# Patient Record
Sex: MALE | Race: WHITE | NOT HISPANIC OR LATINO | Employment: UNEMPLOYED | URBAN - METROPOLITAN AREA
[De-identification: names, ages, dates, MRNs, and addresses within clinical notes are randomized per-mention and may not be internally consistent; named-entity substitution may affect disease eponyms.]

---

## 2023-06-27 ENCOUNTER — HOSPITAL ENCOUNTER (OUTPATIENT)
Dept: DATA CONVERSION | Facility: HOSPITAL | Age: 43
End: 2023-06-27

## 2023-10-23 DIAGNOSIS — F11.90 OPIOID USE DISORDER: Primary | ICD-10-CM

## 2023-10-23 RX ORDER — BUPRENORPHINE AND NALOXONE 8; 2 MG/1; MG/1
1 FILM, SOLUBLE BUCCAL; SUBLINGUAL 3 TIMES DAILY
Qty: 45 FILM | Refills: 0 | Status: SHIPPED | OUTPATIENT
Start: 2023-10-23 | End: 2023-11-07 | Stop reason: SDUPTHER

## 2023-10-23 NOTE — PROGRESS NOTES
Received communication from patient, due to scheduling error secondary to EMR transition patient was not scheduled for his follow up clinic appointment 10/17, patients prior prescription for buprenorphine running out. Given concern for withdrawal/ patient at risk for recurrent opioid use with abrupt cessation of buprenorphine therapy I did send a bridge prescription to the patient's pharmacy, follow up appointment scheduled 11/7/2023 for reassessment.

## 2023-10-28 ENCOUNTER — PHARMACY VISIT (OUTPATIENT)
Dept: PHARMACY | Facility: CLINIC | Age: 43
End: 2023-10-28
Payer: MEDICAID

## 2023-10-30 ENCOUNTER — PHARMACY VISIT (OUTPATIENT)
Dept: PHARMACY | Facility: CLINIC | Age: 43
End: 2023-10-30
Payer: MEDICAID

## 2023-10-30 PROCEDURE — RXMED WILLOW AMBULATORY MEDICATION CHARGE

## 2023-11-07 ENCOUNTER — HOSPITAL ENCOUNTER (OUTPATIENT)
Dept: EMERGENCY MEDICINE | Facility: HOSPITAL | Age: 43
Discharge: HOME | End: 2023-11-07
Payer: MEDICARE

## 2023-11-07 DIAGNOSIS — F11.90 OPIOID USE DISORDER: Primary | ICD-10-CM

## 2023-11-07 PROCEDURE — G2010 REMOT IMAGE SUBMIT BY PT: HCPCS | Performed by: EMERGENCY MEDICINE

## 2023-11-07 RX ORDER — BUPRENORPHINE AND NALOXONE 8; 2 MG/1; MG/1
1 FILM, SOLUBLE BUCCAL; SUBLINGUAL 3 TIMES DAILY
Qty: 30 FILM | Refills: 1 | Status: SHIPPED | OUTPATIENT
Start: 2023-11-07 | End: 2024-03-12 | Stop reason: WASHOUT

## 2023-11-07 ASSESSMENT — ENCOUNTER SYMPTOMS
TREMORS: 0
CHOKING: 0
ARTHRALGIAS: 1
HEADACHES: 0
CHILLS: 0
FEVER: 0
VOMITING: 0
NAUSEA: 0
DYSPHORIC MOOD: 0
JOINT SWELLING: 1

## 2023-11-07 NOTE — PROGRESS NOTES
Subjective   Patient ID: Alden Rothman is a 43 y.o. male who presents for follow up of opioid use disorder    Patient presenting to follow-up clinic for continued management of opioid use disorder, currently in remission on buprenorphine therapy.  Since our last visit, patient feels he has been doing well, moved in with his new girlfriend and is happy with his current housing situation.  Also gained employment at a [employer] in [place], appears happy with his new employment status.  Did recently [place] the [facility] and is requesting a change in pharmacy location due to his new geographical location.  Does note that he reinjured his knee recently, has been treating with a compression brace as well as over-the-counter acetaminophen/ibuprofen.  States that he plans on seeking further care for evaluation of the knee as it has not improved over the last several days.  Otherwise denies any breakthrough cravings/illicit substance use.         Review of Systems   Constitutional:  Negative for chills and fever.   Respiratory:  Negative for choking.    Cardiovascular:  Negative for chest pain.   Gastrointestinal:  Negative for nausea and vomiting.   Musculoskeletal:  Positive for arthralgias and joint swelling.   Neurological:  Negative for tremors and headaches.   Psychiatric/Behavioral:  Negative for dysphoric mood.        Objective   There were no vitals taken for this visit.    Physical Exam  Vitals reviewed: Virtual visit.   Constitutional:       General: He is awake.   Pulmonary:      Comments: Speaking in clear full sentences, no stridor  Neurological:      Mental Status: He is alert.      GCS: GCS eye subscore is 4. GCS verbal subscore is 5. GCS motor subscore is 6.   Psychiatric:         Attention and Perception: Attention and perception normal. He is attentive.         Behavior: Behavior is cooperative.         Assessment/Plan   Diagnoses and all orders for this visit:  Opioid use disorder  -      buprenorphine-naloxone (Suboxone) 8-2 mg SL film; Place 1 Film under the tongue 3 times a day.  -     Drug Screen, Urine With Reflex to Confirmation; Future  -     Buprenorphine Screen To Confirm,Urine; Future    Patient presenting for ongoing evaluation/management of opioid use disorder, currently in remission, stable on buprenorphine therapy for some time.  Overall patient continues to deny any breakthrough substance use, denies any oversedation with use of buprenorphine and denies any withdrawal symptoms/cravings.  No red flags identified today for breakthrough use.  Patient recently starting a new job and obtaining more stable housing which he seems pleased with.  On OARRS review patient did obtain recent bridge prescription, plan to refill the patient's buprenorphine prescription until our next visit.  I did order urine drug screen testing, patient will obtain this at his earliest convenience.  Otherwise plan to follow-up in 6 weeks in clinic preferably in person.

## 2023-11-07 NOTE — PROGRESS NOTES
Alden Rothman is a 43 y.o. male  presenting to clinic for continued evaluation and management of opioid use disorder  Subjective   Patient conveys he is doing much better since our last appointment, moved in with his significant other. Conveys compliance with his buprenorphine regimen. Has garnered gainful employment recently that he feels has been supportive of his goal of continued sobriety. Denies any breakthrough use of opioids or other substances. Denies any cravings.        Objective     Last Recorded Vitals  There were no vitals taken for this visit.  Intake/Output last 3 Shifts:  No intake or output data in the 24 hours ending 11/28/23 1058  Daily Weight  No data found for Wt    Physical Exam  Constitutional:       General: He is not in acute distress.     Appearance: Normal appearance.   HENT:      Head: Normocephalic and atraumatic.   Pulmonary:      Effort: Pulmonary effort is normal. No tachypnea, bradypnea or respiratory distress.   Musculoskeletal:      Cervical back: Full passive range of motion without pain.   Neurological:      General: No focal deficit present.      Mental Status: He is alert and oriented to person, place, and time.      Cranial Nerves: No dysarthria or facial asymmetry.      Motor: No tremor.   Psychiatric:         Attention and Perception: Attention and perception normal. He does not perceive auditory or visual hallucinations.         Mood and Affect: Mood and affect normal.         Speech: Speech normal.         Behavior: Behavior normal. Behavior is cooperative.         Thought Content: Thought content normal.         Cognition and Memory: Cognition normal.         Judgment: Judgment normal.         Relevant Results  Electrocardiogram 12 Lead  Please see ED Provider Note for formal interpretation  Confirmed by Yvonne Cotto ([id number]) on 11/5/2022 9:39:34 PM                 Assessment/Plan      Patient stable on buprenorphine regimen, will continue at 8-2mg suboxone TID. Patient  did request gabapentin for acute on chronic knee pain however I will provide referral to orthopedic surgery/ primary care for this as it is outside the scope of this clinic to manage pain. Did order UDS testing for patient to complete as an outpatient, if he is unable to complete this by his next appointment in 6 weeks will obtain during next in-person visit. Prescription sent to patient's pharmacy.            Ervin Rojas MD    Procedures

## 2023-11-13 DIAGNOSIS — F11.90 OPIOID USE DISORDER: Primary | ICD-10-CM

## 2023-11-13 RX ORDER — BUPRENORPHINE AND NALOXONE 8; 2 MG/1; MG/1
1 FILM, SOLUBLE BUCCAL; SUBLINGUAL 3 TIMES DAILY
Qty: 90 FILM | Refills: 1 | Status: SHIPPED | OUTPATIENT
Start: 2023-11-19 | End: 2024-01-09 | Stop reason: SDUPTHER

## 2023-11-13 NOTE — PROGRESS NOTES
Issue with patient's most recent Buprenorphine prescription, per discussion with pharmacist script sent for 30 strips (10 day supply). Per discussion with patient's pharmacy, refill was cancelled and I resent prescription for 1 month with a refill to bridge patient to next appointment.

## 2023-11-28 NOTE — ADDENDUM NOTE
Encounter addended by: Ervin Rojas MD on: 11/28/2023 11:04 AM   Actions taken: Clinical Note Signed, SmartForm saved, Charge Capture section accepted

## 2024-01-08 PROBLEM — M25.539 PAIN, WRIST JOINT: Status: ACTIVE | Noted: 2024-01-08

## 2024-01-08 RX ORDER — FLUTICASONE PROPIONATE 50 MCG
SPRAY, SUSPENSION (ML) NASAL
COMMUNITY
Start: 2023-02-22

## 2024-01-08 RX ORDER — LORATADINE 10 MG/1
TABLET ORAL
COMMUNITY
Start: 2023-02-22

## 2024-01-08 RX ORDER — NALOXONE HYDROCHLORIDE 4 MG/.1ML
SPRAY NASAL
COMMUNITY
Start: 2022-11-15

## 2024-01-08 RX ORDER — NAPROXEN 500 MG/1
500 TABLET ORAL
COMMUNITY
Start: 2023-09-18

## 2024-01-08 RX ORDER — AZITHROMYCIN 500 MG/1
TABLET, FILM COATED ORAL
COMMUNITY
Start: 2023-02-22

## 2024-01-08 RX ORDER — IBUPROFEN 800 MG/1
1 TABLET ORAL 3 TIMES DAILY PRN
COMMUNITY
Start: 2022-12-09

## 2024-01-08 RX ORDER — DOXYCYCLINE HYCLATE 100 MG
TABLET ORAL
COMMUNITY
Start: 2023-07-01

## 2024-01-09 ENCOUNTER — HOSPITAL ENCOUNTER (OUTPATIENT)
Dept: EMERGENCY MEDICINE | Facility: HOSPITAL | Age: 44
Discharge: HOME | End: 2024-01-09
Payer: MEDICARE

## 2024-01-09 DIAGNOSIS — M25.561 ACUTE PAIN OF RIGHT KNEE: Primary | ICD-10-CM

## 2024-01-09 DIAGNOSIS — F11.90 OPIOID USE DISORDER: Primary | ICD-10-CM

## 2024-01-09 DIAGNOSIS — F11.90 OPIOID USE DISORDER: ICD-10-CM

## 2024-01-09 PROCEDURE — 99213 OFFICE O/P EST LOW 20 MIN: CPT | Performed by: EMERGENCY MEDICINE

## 2024-01-09 RX ORDER — IBUPROFEN 600 MG/1
600 TABLET ORAL EVERY 6 HOURS PRN
Qty: 56 TABLET | Refills: 0 | Status: SHIPPED | OUTPATIENT
Start: 2024-01-09 | End: 2024-01-23

## 2024-01-09 RX ORDER — BUPRENORPHINE AND NALOXONE 8; 2 MG/1; MG/1
1 FILM, SOLUBLE BUCCAL; SUBLINGUAL 3 TIMES DAILY
Qty: 90 FILM | Refills: 1 | Status: SHIPPED | OUTPATIENT
Start: 2024-01-09 | End: 2024-03-12 | Stop reason: WASHOUT

## 2024-01-09 NOTE — PROGRESS NOTES
Alden Rothman is a 43 y.o. male on day 0 of admission presenting with No Principal Problem: There is no principal problem currently on the Problem List. Please update the Problem List and refresh..    Subjective   Patient presenting for ongoing evaluation and management of opioid use disorder, stable on suboxone for several years now. Doing well since last visit, is living with his girlfriend whom is supporting him in his sobriety. Notes no breakthrough use or cravings on current regimen. Does note that he has been having right knee pain over the last 5-6 months, notes his mother had a history of osteoarthritis, he denies any trauma to the area or other clear precipitant for pain, has been using APAP/ ibuprofen for pain control with positive response but is interested in seeking further evaluation for ongoing issues with the knee.       Objective     Physical Exam  Vitals and nursing note reviewed.   Constitutional:       General: He is not in acute distress.     Appearance: Normal appearance. He is not ill-appearing or toxic-appearing.   HENT:      Head: Normocephalic and atraumatic.      Nose: No congestion or rhinorrhea.      Mouth/Throat:      Mouth: Mucous membranes are moist.      Pharynx: Oropharynx is clear. No oropharyngeal exudate or posterior oropharyngeal erythema.   Eyes:      Extraocular Movements: Extraocular movements intact.      Right eye: Normal extraocular motion.      Left eye: Normal extraocular motion.      Conjunctiva/sclera: Conjunctivae normal.      Pupils: Pupils are equal, round, and reactive to light.   Cardiovascular:      Rate and Rhythm: Normal rate and regular rhythm.      Pulses: Normal pulses.      Heart sounds: Normal heart sounds, S1 normal and S2 normal. No murmur heard.     No friction rub. No gallop.   Pulmonary:      Effort: Pulmonary effort is normal. No respiratory distress.      Breath sounds: Normal breath sounds. No stridor. No wheezing, rhonchi or rales.   Abdominal:       General: Abdomen is flat. Bowel sounds are normal. There is no distension.      Palpations: Abdomen is soft.      Tenderness: There is no abdominal tenderness. There is no right CVA tenderness, left CVA tenderness, guarding or rebound.   Musculoskeletal:      Cervical back: Full passive range of motion without pain.      Right knee: No swelling, deformity, effusion, erythema, ecchymosis, bony tenderness or crepitus. Normal range of motion. Tenderness present over the medial joint line and MCL. No lateral joint line, LCL, ACL, PCL or patellar tendon tenderness. Normal alignment, normal meniscus and normal patellar mobility.      Right lower leg: Normal. No edema.      Left lower leg: Normal. No edema.   Skin:     General: Skin is warm and dry.   Neurological:      General: No focal deficit present.      Mental Status: He is alert and oriented to person, place, and time.      GCS: GCS eye subscore is 4. GCS verbal subscore is 5. GCS motor subscore is 6.      Cranial Nerves: No cranial nerve deficit.      Sensory: No sensory deficit.      Motor: No weakness, tremor or abnormal muscle tone.   Psychiatric:         Mood and Affect: Mood normal.         Last Recorded Vitals  There were no vitals taken for this visit.  Intake/Output last 3 Shifts:  No intake/output data recorded.    Relevant Results                             Assessment/Plan   Active Problems:  There are no active Hospital Problems.    Opioid use disorder    Patient presenting for ongoing evaluation and management of opioid use disorder, currently stable on suboxone. No red flags for breakthrough use. No withdrawal symptoms of oversedation with current regimen, patient is amenable to continue buprenorphine therapy. Does note dental issues as well as right knee pain, patient was given information for dental clinic and will send patient referral information for primary care/ ortho clinic for follow up for chronic knee pain. Refill prescription sent to  patients pharmacy as well as short prescription for ibuprofen, order for urine drug testing sent as well, discussed need to obtain this testing. Will follow up in 6 weeks.       I spent 30 minutes in the professional and overall care of this patient.      Ervin Rojas MD

## 2024-02-26 DIAGNOSIS — F11.90 OPIOID USE DISORDER: Primary | ICD-10-CM

## 2024-02-26 RX ORDER — BUPRENORPHINE AND NALOXONE 8; 2 MG/1; MG/1
1 FILM, SOLUBLE BUCCAL; SUBLINGUAL 3 TIMES DAILY
Qty: 42 FILM | Refills: 0 | Status: SHIPPED | OUTPATIENT
Start: 2024-02-28 | End: 2024-03-12 | Stop reason: WASHOUT

## 2024-02-27 NOTE — PROGRESS NOTES
Contacted by patient, patient conveys that he missed his most recent follow up appointment due to a dental surgery. OARRS report shows most recent 30 day fill on 1/29/2024, patient appears to be on pace to run out of buprenorphine 2/28. Will send bridge prescription for 14 days until he can follow up 3/12 (confirmed this date with patient.)

## 2024-03-05 ENCOUNTER — PHARMACY VISIT (OUTPATIENT)
Dept: PHARMACY | Facility: CLINIC | Age: 44
End: 2024-03-05
Payer: MEDICAID

## 2024-03-05 PROCEDURE — RXMED WILLOW AMBULATORY MEDICATION CHARGE

## 2024-03-11 ENCOUNTER — OFFICE VISIT (OUTPATIENT)
Dept: ORTHOPEDIC SURGERY | Facility: HOSPITAL | Age: 44
End: 2024-03-11
Payer: MEDICARE

## 2024-03-11 ENCOUNTER — HOSPITAL ENCOUNTER (OUTPATIENT)
Dept: RADIOLOGY | Facility: HOSPITAL | Age: 44
Discharge: HOME | End: 2024-03-11
Payer: MEDICARE

## 2024-03-11 VITALS — BODY MASS INDEX: 21.97 KG/M2 | HEIGHT: 67 IN | WEIGHT: 140 LBS

## 2024-03-11 DIAGNOSIS — M25.461 KNEE EFFUSION, RIGHT: ICD-10-CM

## 2024-03-11 DIAGNOSIS — G89.29 CHRONIC PAIN OF RIGHT KNEE: ICD-10-CM

## 2024-03-11 DIAGNOSIS — M25.561 CHRONIC PAIN OF RIGHT KNEE: Primary | ICD-10-CM

## 2024-03-11 DIAGNOSIS — G89.29 CHRONIC PAIN OF RIGHT KNEE: Primary | ICD-10-CM

## 2024-03-11 DIAGNOSIS — M23.91 LOCKING KNEE, RIGHT: ICD-10-CM

## 2024-03-11 DIAGNOSIS — M25.561 CHRONIC PAIN OF RIGHT KNEE: ICD-10-CM

## 2024-03-11 PROCEDURE — 73564 X-RAY EXAM KNEE 4 OR MORE: CPT | Mod: RT

## 2024-03-11 PROCEDURE — 99204 OFFICE O/P NEW MOD 45 MIN: CPT | Performed by: PHYSICIAN ASSISTANT

## 2024-03-11 PROCEDURE — 73564 X-RAY EXAM KNEE 4 OR MORE: CPT | Mod: RIGHT SIDE | Performed by: RADIOLOGY

## 2024-03-11 PROCEDURE — 99214 OFFICE O/P EST MOD 30 MIN: CPT | Performed by: PHYSICIAN ASSISTANT

## 2024-03-11 ASSESSMENT — PAIN DESCRIPTION - DESCRIPTORS: DESCRIPTORS: STABBING

## 2024-03-11 ASSESSMENT — PAIN - FUNCTIONAL ASSESSMENT: PAIN_FUNCTIONAL_ASSESSMENT: 0-10

## 2024-03-11 ASSESSMENT — PAIN SCALES - GENERAL: PAINLEVEL_OUTOF10: 7

## 2024-03-11 NOTE — PATIENT INSTRUCTIONS
You were ordered a MRI of the right knee.  Please call [phone] to schedule your MRI.  When your MRI is complete, please call the office at [phone] and leave your name and number.  We will call you back with your results    Patient should avoid deep flexion of the knee including kneeling, squatting or sitting in low chairs.  They should also avoid impact activities such as running and jumping but can use a stationary bike, pool exercises and upper body training.    1. Follow stretching exercises that were on a separate handout   2. Hold each stretch for a least 1 minute  3. Do not bounce while stretching  4. Stretch for 10 minutes at a time, 3x a day for 6 weeks then daily  5. Remember, it takes several weeks to a few months of consistent stretching to increase flexibility and decrease symptoms.     You can use OTC Voltaren gel or aspercream and apply it to the injured area.    Ice and elevate supported at the calf with no pressure on the heel to reduce swelling.    Follow up pending MRI results

## 2024-03-11 NOTE — PROGRESS NOTES
NPV-   History of Present Illness  43 y.o.male presents at same day walk in clinic for  Right knee pain  1. Chronic pain of right knee  XR knee right 4+ views      2. Knee effusion, right  MR knee right wo IV contrast      3. Locking knee, right  MR knee right wo IV contrast      Mechanism of injury: basketball; twisting knee  Date of Injury/Pain: 1 year ago  Location of pain: medial knee  Frequency of Pain: worse with walking or bending  Associated symptoms? Swelling.  Previous treatment?  Brace, crutches; NSAIDs; Patient has been doing guided activity modification and exercises with little or no improvement.   He has locking of the knee    27 point review of systems negative except what is stated in HPI       On examination of the right knee:  Normal gait, neutral alignment  1+ effusion pf the knee. No bruising or atrophy.  Neutral alignment.    Normal range of motion in extension and flexion.   Normal strength in flexion and extension.  No extensor lag.    Tenderness to palpation: medial joint line  No tenderness to palpation over the lateral joint line, tibial plateau, femoral condyles, quadriceps tendon, patellar tendon, patella, MCL or LCL.    Neurovascularly intact.  Normal sensation to light touch.  Popliteal, dorsalis pedis and posterior tibial pulses 2+ bilaterally.    Positive Shandra's test.   Positive Apley's test.   Negative anterior drawer test.   Negative posterior drawer test.    Negative Lachman's.   Negative valgus stress test at 0 and 30° flexion.   Negative varus stress test at 0 and 30° flexion.   1-1 medial/lateral in 30 degrees flexion, 2-1 medial/lateral at  0 degrees with patellar glide test.   Positive patellar grind test.     I personally reviewed the patient's x-ray images and reports of the right knee. The xrays show no fractures or dislocation.  Normal joint spacing    ASSESSMENT: right knee effusion, locking    PLAN: Treatment options were discussed with the patient. I certify  medical necessity and need for MRI. Patient was ordered a MRI of the right knee for knee effusion. They will call to schedule the MRI and call the office once complete. The patient was given a prescription for physical therapy.  Physical therapy is medically necessary to improve strength, balance, range of motion and functional outcomes after injury and/or surgery. Patient should avoid deep flexion of the knee including kneeling, squatting or sitting in low chairs.  They should also avoid impact activities such as running and jumping but can use a stationary bike, pool exercises and upper body training. Patient was given a handout and instructed on an at home stretching program.  They should do these exercises 3 times per day for 6 weeks and then daily. Patient can use OTC aspercream for pain and continue to ice and elevate supported at the calf to reduce swelling. All the patient's questions were answered. The patient agrees with the above plan.  Follow up pending MRI results

## 2024-03-12 ENCOUNTER — APPOINTMENT (OUTPATIENT)
Dept: RADIOLOGY | Facility: HOSPITAL | Age: 44
End: 2024-03-12
Payer: MEDICARE

## 2024-03-12 ENCOUNTER — HOSPITAL ENCOUNTER (OUTPATIENT)
Dept: EMERGENCY MEDICINE | Facility: HOSPITAL | Age: 44
Discharge: HOME | End: 2024-03-12
Payer: MEDICARE

## 2024-03-12 DIAGNOSIS — F11.90 OPIOID USE DISORDER: Primary | ICD-10-CM

## 2024-03-12 PROCEDURE — 99214 OFFICE O/P EST MOD 30 MIN: CPT | Performed by: EMERGENCY MEDICINE

## 2024-03-12 RX ORDER — BUPRENORPHINE AND NALOXONE 8; 2 MG/1; MG/1
1 FILM, SOLUBLE BUCCAL; SUBLINGUAL 3 TIMES DAILY
Qty: 90 EACH | Refills: 1 | Status: SHIPPED | OUTPATIENT
Start: 2024-03-12 | End: 2024-04-23 | Stop reason: SDUPTHER

## 2024-03-13 NOTE — PROGRESS NOTES
Alden Rothman is a 43 y.o. male presenting virtually for ongoing evaluation and management for opioid use disorder.     Subjective   Patient presenting for ongoing evaluation and management of opioid use disorder. Has been stable on buprenorphine therapy for some time. Notes no breakthrough use, notes no withdrawal symptoms. Endorses compliance with home buprenorphine regimen. Discussed chronic right knee pain during last visit, he was able to follow up with orthopedic surgery and notes that he is pursuing further evaluation with MRI with likely plan for PT/ OT management. Patient denies other acute illness or injury.       Objective     Physical Exam  Constitutional:       General: He is not in acute distress.     Appearance: Normal appearance. He is not ill-appearing, toxic-appearing or diaphoretic.   HENT:      Head: Normocephalic.   Eyes:      General:         Right eye: No discharge.         Left eye: No discharge.   Musculoskeletal:      Cervical back: Full passive range of motion without pain.   Neurological:      Mental Status: He is alert and oriented to person, place, and time.      GCS: GCS eye subscore is 4. GCS verbal subscore is 5. GCS motor subscore is 6.      Cranial Nerves: No dysarthria.   Psychiatric:         Attention and Perception: Attention normal.         Mood and Affect: Mood normal.         Speech: Speech normal.         Behavior: Behavior is cooperative.         Thought Content: Thought content normal.         Cognition and Memory: Cognition and memory normal.         Judgment: Judgment normal.         Last Recorded Vitals  There were no vitals taken for this visit.  Intake/Output last 3 Shifts:  No results found for this or any previous visit (from the past 24 hour(s)).    Scheduled medications    Continuous medications    PRN medications             Assessment/Plan   Active Problems:  There are no active Hospital Problems.    Opioid use disorder    Patient presenting for ongoing  evaluation of OUD. Stable on home buprenorphine dose, no red flags for breakthrough use. Will obtain UDS at next visit. Refill for patient's buprenorphine sent to patient's pharmacy, will follow up in 6 weeks.        I spent 35 minutes in the professional and overall care of this patient.      Ervin Rojas MD

## 2024-03-13 NOTE — ADDENDUM NOTE
Encounter addended by: Ervin Rojas MD on: 3/13/2024 6:37 PM   Actions taken: Level of Service modified, SmartForm saved, Clinical Note Signed

## 2024-03-18 ENCOUNTER — PHARMACY VISIT (OUTPATIENT)
Dept: PHARMACY | Facility: CLINIC | Age: 44
End: 2024-03-18
Payer: MEDICAID

## 2024-03-18 PROCEDURE — RXMED WILLOW AMBULATORY MEDICATION CHARGE

## 2024-03-25 ENCOUNTER — HOSPITAL ENCOUNTER (OUTPATIENT)
Dept: RADIOLOGY | Facility: HOSPITAL | Age: 44
Discharge: HOME | End: 2024-03-25
Payer: MEDICARE

## 2024-03-25 DIAGNOSIS — M25.461 KNEE EFFUSION, RIGHT: ICD-10-CM

## 2024-03-25 DIAGNOSIS — M23.91 LOCKING KNEE, RIGHT: ICD-10-CM

## 2024-03-25 PROCEDURE — 73721 MRI JNT OF LWR EXTRE W/O DYE: CPT | Mod: RIGHT SIDE | Performed by: RADIOLOGY

## 2024-03-25 PROCEDURE — 73721 MRI JNT OF LWR EXTRE W/O DYE: CPT | Mod: RT

## 2024-03-26 ENCOUNTER — TELEPHONE (OUTPATIENT)
Dept: ORTHOPEDIC SURGERY | Facility: CLINIC | Age: 44
End: 2024-03-26
Payer: MEDICARE

## 2024-04-15 ENCOUNTER — APPOINTMENT (OUTPATIENT)
Dept: ORTHOPEDIC SURGERY | Facility: HOSPITAL | Age: 44
End: 2024-04-15
Payer: MEDICARE

## 2024-04-15 ENCOUNTER — PHARMACY VISIT (OUTPATIENT)
Dept: PHARMACY | Facility: CLINIC | Age: 44
End: 2024-04-15
Payer: MEDICAID

## 2024-04-15 PROCEDURE — RXMED WILLOW AMBULATORY MEDICATION CHARGE

## 2024-04-23 ENCOUNTER — HOSPITAL ENCOUNTER (OUTPATIENT)
Dept: EMERGENCY MEDICINE | Facility: HOSPITAL | Age: 44
Discharge: HOME | End: 2024-04-23
Payer: MEDICARE

## 2024-04-23 DIAGNOSIS — F11.90 OPIOID USE DISORDER: ICD-10-CM

## 2024-04-23 RX ORDER — BUPRENORPHINE AND NALOXONE 8; 2 MG/1; MG/1
1 FILM, SOLUBLE BUCCAL; SUBLINGUAL 3 TIMES DAILY
Qty: 90 EACH | Refills: 1 | Status: SHIPPED | OUTPATIENT
Start: 2024-04-23 | End: 2024-06-04 | Stop reason: SDUPTHER

## 2024-04-23 NOTE — PROGRESS NOTES
Alden Rothman is a 43 y.o. male PMHx opioid use disorder presenting for ongoing evaluation and management of opioid use disorder.    Patient presents virtually to clinic today, provided verbal consent for virtual visit.    Subjective   Patient doing well overall, recently moved into a new apartment. Was recently diagnosed with a meniscus injury of his knee (had been experiencing swelling and pain in the knee for the last year) per EMR review tentative plan for operative management however patient is wanting to address moving prior to considering surgery, he endorses he is still working with his orthopedic surgeon in this regard. Does also endorse interest in seeking evaluation/ treatment for hepatitis C, was diagnosed in 2021 through [facility] and has thus far not sought treatment for this.       Objective     Physical Exam  Constitutional:       General: He is not in acute distress.     Appearance: Normal appearance. He is not ill-appearing, toxic-appearing or diaphoretic.   HENT:      Head: Normocephalic and atraumatic.   Pulmonary:      Comments: Speaking in full, clear sentences  Musculoskeletal:      Cervical back: Full passive range of motion without pain.   Neurological:      General: No focal deficit present.      Mental Status: He is alert.      GCS: GCS eye subscore is 4. GCS verbal subscore is 5. GCS motor subscore is 6.      Cranial Nerves: No dysarthria or facial asymmetry.   Psychiatric:         Attention and Perception: Attention and perception normal.         Speech: Speech normal.         Behavior: Behavior is cooperative.         Thought Content: Thought content normal.         Cognition and Memory: Cognition and memory normal.         Judgment: Judgment normal.         Last Recorded Vitals  There were no vitals taken for this visit.  Intake/Output last 3 Shifts:  No intake/output data recorded.    Relevant Results  No results found for this or any previous visit (from the past 24  hour(s)).                   Assessment/Plan   Active Problems:  There are no active Hospital Problems.    Opioid use disorder    Patient presenting for ongoing evaluation and management of opioid use disorder, stable on Suboxone therapy. Overall doing well, no red flags, plan to continue 8-2mg SL buprenorphine TID for the foreseeable future. Did sent prescription to patient's pharmacy with one refill, will see patient back in 6 weeks.   In regards to patient's knee surgery, unclear time frame of when operative intervention will occur, will follow up with patient regarding ongoing bup therapy through surgery as well as opioid mitigation strategies in the perioperative period.  In regards to patient's interest in seeking further evaluation and management for hepatitis C; did provide referral to primary care as well as ID clinic for further evaluation. Discussed importance of establishing appropriate follow up with the patient.       I spent 20 minutes in the professional and overall care of this patient.      Ervin Rojas MD

## 2024-05-06 ENCOUNTER — APPOINTMENT (OUTPATIENT)
Dept: ORTHOPEDIC SURGERY | Facility: HOSPITAL | Age: 44
End: 2024-05-06
Payer: COMMERCIAL

## 2024-05-09 ENCOUNTER — APPOINTMENT (OUTPATIENT)
Dept: ORTHOPEDIC SURGERY | Facility: CLINIC | Age: 44
End: 2024-05-09
Payer: COMMERCIAL

## 2024-05-13 ENCOUNTER — PHARMACY VISIT (OUTPATIENT)
Dept: PHARMACY | Facility: CLINIC | Age: 44
End: 2024-05-13
Payer: MEDICAID

## 2024-05-13 PROCEDURE — RXMED WILLOW AMBULATORY MEDICATION CHARGE

## 2024-05-14 ENCOUNTER — OFFICE VISIT (OUTPATIENT)
Dept: ORTHOPEDIC SURGERY | Facility: CLINIC | Age: 44
End: 2024-05-14
Payer: COMMERCIAL

## 2024-05-14 DIAGNOSIS — S83.241A ACUTE MEDIAL MENISCUS TEAR OF RIGHT KNEE, INITIAL ENCOUNTER: ICD-10-CM

## 2024-05-14 PROCEDURE — L1812 KO ELASTIC W/JOINTS PRE OTS: HCPCS | Performed by: ORTHOPAEDIC SURGERY

## 2024-05-14 PROCEDURE — 99214 OFFICE O/P EST MOD 30 MIN: CPT | Performed by: ORTHOPAEDIC SURGERY

## 2024-05-14 NOTE — PROGRESS NOTES
This is a pleasant 43 y.o. year old male who presents for fuv of  right knee.   He states that he gets medial knee pain, sometimes certain flexion and rotation positions of his knee worsens the pain.   Anterior knee pain at times also.  One locking episode in the past.  Interventions: MRI done  Soc hx Smokes marijuana    PHYSICAL EXAMINATION  Constitutional Exam: patient's height and weight reviewed, well-kempt  Psychiatric Exam: alert and oriented x 3, appropriate mood and behavior  Eye Exam: DIMITRY, EOMI  Pulmonary Exam: breathing non-labored, no apparent distress  Lymphatic exam: no appreciable lymphadenopathy in the lower extremities  Cardiovascular exam: DP pulses 2+ bilaterally, PT 2+ bilaterally, toes are pink with good capillary refill, no pitting edema  Skin exam: no open lesions, rashes, abrasions or ulcerations  Neurological exam: sensation to light touch intact in both lower extremities in peripheral and dermatomal distributions (except for any abnormalities noted in musculoskeletal exam)    Musculoskeletal exam: right knee: no effusion of knee, stable ligamentous exam, matthew causes pain medially, full ROM, no pain with hip rotation    DATA/RESULTS REVIEW: I personally reviewed the patient's mri images and reports of the  right knee shows complex tear posterior horn of medial meniscus with likely radial component. Some Arthrosis of patellofemoral joint . Chondromalacia localized lateral femoral condyle but no full thickness defect    ASSESSMENT: right knee complex medial meniscal tear with radial component, patellar chondromalacia  PLAN: Further treatment options discussed including recommendation for arthroscopy of the right knee to do partial menisectomy, possible need to fix radial tear if extends to red-red zone otherwise will make meniscus nonfunctional and lead to rapidly progressing arthritis due to loss of medial meniscal function.  Reviewed risks and benefits of surgery.  Patient states that  he has had a long standing fear of not waking up after surgery.  He states that he realizes that surgery is safe.  We offered to have him meet with anesthesia beforehand if he thinks about options and selects surgery.  He states that he does not want to do surgical option.  We reviewed with him the risks and benefits of conservative treatment.  He voiced understanding of the risks of not removing the tear, tear progression, joint locking, and significant arthritis developing over time.  To offload the medial compartment of hte knee, we recommended an osteoarthritis type of medial offloader brace.  I certify the medical necessity of the medial offloader knee brace to help alleviate stress over the medial meniscus and medial compartment.  The patient's questions were answered in detail.      Patient was prescribed a medial offloader osteoarthritis brace for medial mensical tear problem.  The patient is ambulatory with or without aid; but, has weakness, instability and/or deformity of their right lower extremity which requires stabilization from this orthosis to improve their function.      Verbal and written instructions for the use, wear schedule, cleaning and application of this item were given.  Patient was instructed that should the brace result in increased pain, decreased sensation, increased swelling, or an overall worsening of their medical condition, to please contact our office immediately.     Orthotic management and training was provided for skin care, modifications due to healing tissues, edema changes, interruption in skin integrity, and safety precautions with the orthosis.      Note dictated with [employer] software, completed without full type editing to avoid delay.

## 2024-05-22 ENCOUNTER — APPOINTMENT (OUTPATIENT)
Dept: INFECTIOUS DISEASES | Facility: CLINIC | Age: 44
End: 2024-05-22
Payer: COMMERCIAL

## 2024-06-04 ENCOUNTER — HOSPITAL ENCOUNTER (OUTPATIENT)
Dept: EMERGENCY MEDICINE | Facility: HOSPITAL | Age: 44
Discharge: HOME | End: 2024-06-04
Payer: COMMERCIAL

## 2024-06-04 DIAGNOSIS — F11.90 OPIOID USE DISORDER: ICD-10-CM

## 2024-06-04 PROCEDURE — 99213 OFFICE O/P EST LOW 20 MIN: CPT | Performed by: EMERGENCY MEDICINE

## 2024-06-04 RX ORDER — BUPRENORPHINE AND NALOXONE 8; 2 MG/1; MG/1
1 FILM, SOLUBLE BUCCAL; SUBLINGUAL 3 TIMES DAILY
Qty: 90 EACH | Refills: 2 | Status: SHIPPED | OUTPATIENT
Start: 2024-06-04 | End: 2024-07-10

## 2024-06-04 ASSESSMENT — ENCOUNTER SYMPTOMS: ARTHRALGIAS: 1

## 2024-06-04 NOTE — PROGRESS NOTES
"Subjective   Alden Rothman is a 44 y.o. male who presents for routine Buprenorphine follow-up assessment.    Endorses stability on current buprenorphine regimen. Currently living with significant other. Recent diagnosis of meniscal tear, is considering surgery but is unsure about proceeding with the procedure.       OARRS:  No data recorded  I have personally reviewed the OARRS report for Alden Rothman. I have considered the risks of abuse, dependence, addiction and diversion    Urine Drug Screening  Last Urine Drug Screen: Ordered this visit    No results found for this or any previous visit (from the past 8760 hour(s)).  N/A    Withdrawal Symptoms: none  Buprenorphine Side Effects: none        Agreement for Buprenorphine/(Naloxone) for the treatment of Opioid use  Reviewed Controlled Substance Agreement including but not limited to the benefits, risks, and alternatives to treatment with a Controlled Substance medication(s).   Date of the Last Agreement: 6/4/2024    Nicotine Use  smoker, not ready     Health Maintenance  No results found for: \"TBSIN\", \"PPD\"   No results found for: \"PREGTESTUR\", \"PREGSERUM\"  HIV 1 and 2 Screen   Date Value Ref Range Status   06/29/2023 NONREACTIVE NONREACTIVE Final     Comment:      HIV Ag/Ab screen is performed using the [employer]   HIV Ag/Ab Combo assay which detects the presence of HIV    p24 antigen as well as antibodies to HIV-1   (Group M and O) and HIV-2.  .  No laboratory evidence of HIV infection. If acute HIV infection is   suspected, consider testing for HIV RNA by PCR (viral load).       No results found for: \"LABRPR\", \"RPR\"  No results found for: \"HEPCAB\", \"HEPBCAB\", \"HEPAIGM\"    Patient is meeting requirements of buprenorphine program, including : abstinence from alcohol and other drugs , keeping appointments , and meeting functional/personal goals      Review of Systems   Musculoskeletal:  Positive for arthralgias.       Objective   There were no vitals taken " for this visit.    Physical Exam  Vitals and nursing note reviewed.   Constitutional:       General: He is not in acute distress.     Appearance: Normal appearance. He is not ill-appearing or toxic-appearing.   HENT:      Head: Normocephalic and atraumatic.      Nose: No congestion or rhinorrhea.      Mouth/Throat:      Mouth: Mucous membranes are moist.      Pharynx: Oropharynx is clear. No oropharyngeal exudate or posterior oropharyngeal erythema.   Eyes:      Extraocular Movements: Extraocular movements intact.      Right eye: Normal extraocular motion.      Left eye: Normal extraocular motion.      Conjunctiva/sclera: Conjunctivae normal.      Pupils: Pupils are equal, round, and reactive to light.   Cardiovascular:      Rate and Rhythm: Normal rate and regular rhythm.      Pulses: Normal pulses.      Heart sounds: Normal heart sounds, S1 normal and S2 normal. No murmur heard.     No friction rub. No gallop.   Pulmonary:      Effort: Pulmonary effort is normal. No respiratory distress.      Breath sounds: Normal breath sounds. No stridor. No wheezing, rhonchi or rales.   Abdominal:      General: Abdomen is flat. Bowel sounds are normal. There is no distension.      Palpations: Abdomen is soft.      Tenderness: There is no abdominal tenderness. There is no right CVA tenderness, left CVA tenderness, guarding or rebound.   Musculoskeletal:      Cervical back: Full passive range of motion without pain.      Right lower leg: No edema.      Left lower leg: No edema.   Skin:     General: Skin is warm and dry.   Neurological:      General: No focal deficit present.      Mental Status: He is alert and oriented to person, place, and time.      GCS: GCS eye subscore is 4. GCS verbal subscore is 5. GCS motor subscore is 6.      Cranial Nerves: No cranial nerve deficit.      Sensory: No sensory deficit.      Motor: No weakness, tremor or abnormal muscle tone.   Psychiatric:         Mood and Affect: Mood normal.          Assessment/Plan       Buprenorphine Program:  Meeting requirements to continue program, for abstinence based OUD recovery goals.  OARRS: Results are as expected.   Recovery Activities: Buprenorphine maintenance therapy  Challenges: None identified    Risks for abuse of Buprenorphine/Naloxone and safety requirements  (hidden, locked area, out of reach of children and pets; Narcan prescription) are revisited yearly with CSA and as needed- no current concerns.    Benefits of treatment for relapse prevention outweigh risks associated with Buprenorphine.    Plan/Revision to Plan:  Continue current dose of Buprenorphine/Naloxone:   8/2 mg daily  OARRS at every appointment and as needed.  Drug screen random, quarterly and at other times as needed.  Meetings encouraged when safe and accessible.  Counselling available as needed.    Overall seems to be doing well on stable buprenorphine dose, he endorses motivation to continue therapy. Will continue current regimen and follow up in 3 months, UDS/ urine bup testing ordered today.    Follow-up in 3 months with Buprenorphine Program

## 2024-06-10 ENCOUNTER — PHARMACY VISIT (OUTPATIENT)
Dept: PHARMACY | Facility: CLINIC | Age: 44
End: 2024-06-10
Payer: MEDICAID

## 2024-06-10 PROCEDURE — RXMED WILLOW AMBULATORY MEDICATION CHARGE

## 2024-07-07 PROCEDURE — RXMED WILLOW AMBULATORY MEDICATION CHARGE

## 2024-07-08 ENCOUNTER — PHARMACY VISIT (OUTPATIENT)
Dept: PHARMACY | Facility: CLINIC | Age: 44
End: 2024-07-08
Payer: MEDICAID

## 2024-07-24 ENCOUNTER — APPOINTMENT (OUTPATIENT)
Dept: PRIMARY CARE | Facility: CLINIC | Age: 44
End: 2024-07-24
Payer: COMMERCIAL

## 2024-07-24 VITALS
DIASTOLIC BLOOD PRESSURE: 77 MMHG | SYSTOLIC BLOOD PRESSURE: 121 MMHG | HEART RATE: 82 BPM | OXYGEN SATURATION: 95 % | TEMPERATURE: 97.6 F | HEIGHT: 68 IN | WEIGHT: 157.1 LBS | BODY MASS INDEX: 23.81 KG/M2

## 2024-07-24 DIAGNOSIS — R07.9 CHEST PAIN, UNSPECIFIED TYPE: ICD-10-CM

## 2024-07-24 DIAGNOSIS — F11.90 OPIOID USE DISORDER: Primary | ICD-10-CM

## 2024-07-24 DIAGNOSIS — B19.20 HEPATITIS C VIRUS INFECTION WITHOUT HEPATIC COMA, UNSPECIFIED CHRONICITY: ICD-10-CM

## 2024-07-24 LAB
AMPHETAMINES UR QL SCN: ABNORMAL
BARBITURATES UR QL SCN: ABNORMAL
BENZODIAZ UR QL SCN: ABNORMAL
BZE UR QL SCN: ABNORMAL
CANNABINOIDS UR QL SCN: ABNORMAL
FENTANYL+NORFENTANYL UR QL SCN: ABNORMAL
METHADONE UR QL SCN: ABNORMAL
OPIATES UR QL SCN: ABNORMAL
OXYCODONE+OXYMORPHONE UR QL SCN: ABNORMAL
PCP UR QL SCN: ABNORMAL

## 2024-07-24 PROCEDURE — 3008F BODY MASS INDEX DOCD: CPT | Performed by: STUDENT IN AN ORGANIZED HEALTH CARE EDUCATION/TRAINING PROGRAM

## 2024-07-24 PROCEDURE — 80349 CANNABINOIDS NATURAL: CPT

## 2024-07-24 PROCEDURE — 99204 OFFICE O/P NEW MOD 45 MIN: CPT | Performed by: STUDENT IN AN ORGANIZED HEALTH CARE EDUCATION/TRAINING PROGRAM

## 2024-07-24 PROCEDURE — 93000 ELECTROCARDIOGRAM COMPLETE: CPT | Performed by: STUDENT IN AN ORGANIZED HEALTH CARE EDUCATION/TRAINING PROGRAM

## 2024-07-24 PROCEDURE — 80307 DRUG TEST PRSMV CHEM ANLYZR: CPT

## 2024-07-24 ASSESSMENT — ENCOUNTER SYMPTOMS
OCCASIONAL FEELINGS OF UNSTEADINESS: 0
LOSS OF SENSATION IN FEET: 0
DEPRESSION: 0

## 2024-07-24 ASSESSMENT — PATIENT HEALTH QUESTIONNAIRE - PHQ9
1. LITTLE INTEREST OR PLEASURE IN DOING THINGS: NOT AT ALL
SUM OF ALL RESPONSES TO PHQ9 QUESTIONS 1 AND 2: 0
2. FEELING DOWN, DEPRESSED OR HOPELESS: NOT AT ALL

## 2024-07-24 ASSESSMENT — PAIN SCALES - GENERAL: PAINLEVEL: 0-NO PAIN

## 2024-07-24 ASSESSMENT — COLUMBIA-SUICIDE SEVERITY RATING SCALE - C-SSRS
6. HAVE YOU EVER DONE ANYTHING, STARTED TO DO ANYTHING, OR PREPARED TO DO ANYTHING TO END YOUR LIFE?: NO
2. HAVE YOU ACTUALLY HAD ANY THOUGHTS OF KILLING YOURSELF?: NO
1. IN THE PAST MONTH, HAVE YOU WISHED YOU WERE DEAD OR WISHED YOU COULD GO TO SLEEP AND NOT WAKE UP?: NO

## 2024-07-24 NOTE — PROGRESS NOTES
"  Medical record number: [id number]    Subjective   Patient ID: Alden Rothman is a 44 y.o. male who presents for Establish Care (Wants a few blood test done and has chest pain ).    1. Chest discomfort  30-60 min  \"Tightness\"  Right chest or left axilla  Fluttering  Denies vision change, LH  Spontaneously resolving  Stresses: employment, housing  Caffeine: 3 coffee or 2 [employer]  PHQ-2 zero    A/  Last available EKG : 97 bpm, Qtc 457 bpm, growing concern of LVH in V2, V3  /77  Cardiac exam RRR, no M/R/G, no pericardial heave   POS palpable PMI    P/  Discussed findings of EKG. Discussed context of +Hep C and possible effects on heart health.  [ ] lipids, CMP, CBC today  [ ] consider ECHO for increased amplitudes QRS in precordial leads, palpable PMI, in setting of formal IV drug use    2. Hepatitis C, untreated  Previous girlfriend, who was also IV drug user, reported to him that she was diagnosed after they broke up  Lab confirmation 2021  Never seen by hepatology    A/P/  Abdominal exam unremarkable  No jaundice, icterus, asterixis  [ ] referral to Hepatology           Social History:  - Lives with fiance, apt  - Feels safe YES  - Tobacco or vapin ppd 20 years  - Alcohol use: NO  - Marijuana use: YES, frequency few days a week  - Illicit drug use: remote fentanyl and heroine  - caffeine YES    Family History:  Mother's side +HTN   Mat grandmother DM2    Past Medical History:  +Hep C  Substance use (remote fentanyl and heroine)    Past Surgical History:  None    Review of Systems   All other systems reviewed and are negative.      Objective   /77 (BP Location: Right arm, Patient Position: Sitting, BP Cuff Size: Adult)   Pulse 82   Temp 36.4 °C (97.6 °F) (Temporal)   Ht 1.727 m (5' 8\")   Wt 71.3 kg (157 lb 1.6 oz)   SpO2 95%   BMI 23.89 kg/m²     Physical Exam  Vitals reviewed.   Constitutional:       General: He is not in acute distress.  HENT:      Head: Normocephalic and " atraumatic.      Nose: Nose normal.      Mouth/Throat:      Mouth: Mucous membranes are moist.      Pharynx: Oropharynx is clear.   Eyes:      Extraocular Movements: Extraocular movements intact.      Conjunctiva/sclera: Conjunctivae normal.      Pupils: Pupils are equal, round, and reactive to light.   Cardiovascular:      Rate and Rhythm: Normal rate.      Pulses: Normal pulses.      Heart sounds: Normal heart sounds. No murmur heard.     No friction rub. No gallop.      Comments: palpable PMI  Pulmonary:      Effort: Pulmonary effort is normal.      Breath sounds: Normal breath sounds.   Abdominal:      General: Abdomen is flat. Bowel sounds are normal.      Palpations: Abdomen is soft.   Musculoskeletal:      Cervical back: Normal range of motion and neck supple.   Skin:     General: Skin is warm and dry.      Capillary Refill: Capillary refill takes less than 2 seconds.   Neurological:      General: No focal deficit present.      Mental Status: He is alert.   Psychiatric:         Mood and Affect: Mood normal.         Behavior: Behavior normal.         Assessment/Plan   Problem List Items Addressed This Visit             ICD-10-CM    Opioid use disorder - Primary F11.90    Relevant Orders    Drug Screen, Urine With Reflex to Confirmation (Completed)    THC (Marijuana), Urine, Confirmation     Other Visit Diagnoses         Codes    Hepatitis C virus infection without hepatic coma, unspecified chronicity     B19.20    Relevant Orders    Referral to Hepatology    Lipid panel    CBC and Auto Differential    Comprehensive metabolic panel    Hepatitis C RNA, Quantitative, PCR    HIV 1/2 Antigen/Antibody Screen with Reflex to Confirmation    Chest pain, unspecified type     R07.9    Relevant Orders    ECG 12 lead (Clinic Performed) (Completed)                 OVERALL PLAN:  [ ] lipids, CMP, CBC  [ ] lifetime HIV screen  [ ] referral to Hepatology  [ ] Hep C quantitative analysis  [ ] consider ECHO for increased  amplitudes QRS in precordial leads, palpable PMI, in setting of formal IV drug use    Note: this documentation was entered into the electronic medical record using [employer] medical dictation software, and was not necessarily edited thereafter. Please excuse any errors of spelling or grammar.

## 2024-07-28 LAB — CARBOXYTHC UR-MCNC: >500 NG/ML

## 2024-08-05 ENCOUNTER — PHARMACY VISIT (OUTPATIENT)
Dept: PHARMACY | Facility: CLINIC | Age: 44
End: 2024-08-05
Payer: MEDICAID

## 2024-08-05 PROCEDURE — RXMED WILLOW AMBULATORY MEDICATION CHARGE

## 2024-08-06 ENCOUNTER — HOSPITAL ENCOUNTER (OUTPATIENT)
Dept: EMERGENCY MEDICINE | Facility: HOSPITAL | Age: 44
Discharge: HOME | End: 2024-08-06
Payer: COMMERCIAL

## 2024-08-06 DIAGNOSIS — F11.90 OPIOID USE DISORDER: ICD-10-CM

## 2024-08-06 DIAGNOSIS — K59.03 DRUG-INDUCED CONSTIPATION: Primary | ICD-10-CM

## 2024-08-06 PROCEDURE — 99213 OFFICE O/P EST LOW 20 MIN: CPT | Performed by: EMERGENCY MEDICINE

## 2024-08-06 RX ORDER — BUPRENORPHINE AND NALOXONE 8; 2 MG/1; MG/1
1 FILM, SOLUBLE BUCCAL; SUBLINGUAL 3 TIMES DAILY
Qty: 90 EACH | Refills: 1 | Status: SHIPPED | OUTPATIENT
Start: 2024-09-05 | End: 2024-10-05

## 2024-08-06 RX ORDER — BISACODYL 5 MG
5 TABLET, DELAYED RELEASE (ENTERIC COATED) ORAL DAILY PRN
Qty: 30 TABLET | Refills: 0 | Status: SHIPPED | OUTPATIENT
Start: 2024-08-06 | End: 2024-09-05

## 2024-08-06 ASSESSMENT — ENCOUNTER SYMPTOMS
CHEST TIGHTNESS: 0
HEADACHES: 0
COUGH: 0
PHOTOPHOBIA: 0
NAUSEA: 0
ABDOMINAL PAIN: 0
ACTIVITY CHANGE: 0
BACK PAIN: 0
VOMITING: 0
NECK PAIN: 0
CONSTIPATION: 1
LIGHT-HEADEDNESS: 0
CHILLS: 0
RHINORRHEA: 0
FATIGUE: 0
FLANK PAIN: 0
ABDOMINAL DISTENTION: 0

## 2024-08-06 NOTE — PROGRESS NOTES
Subjective   Alden Rothman is a 44 y.o. male who presents for routine Buprenorphine follow-up assessment.      Patient presents for ongoing buprenorphine therapy for opioid use disorder. Patient endorses compliance on current regimen, denies any breakthrough use of illicit opioids, denies any cravings, denies any oversedation with medication administration. UDS from 7/24 showing cannabinoids, no other substances. 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 Recent diagnosis of a right meniscal tear for which he is following with orthopedic surgery, notes tentative plan for operative management in December/ January. Notes pain in the knee is managable with NSAIDS. Does note some constipation over the last week without associated nausea, vomiting, abdominal pain. Denies any other recent illness or injury        OARRS:  No data recorded  I have personally reviewed the OARRS report for Alden Rothman. I have considered the risks of abuse, dependence, addiction and diversion    Urine Drug Screening  Last Urine Drug Screen: 7/24/2024    Recent Results (from the past 8760 hour(s))   Drug Screen, Urine With Reflex to Confirmation    Collection Time: 07/24/24  4:26 PM   Result Value Ref Range    Amphetamine Screen, Urine Presumptive Negative Presumptive Negative    Barbiturate Screen, Urine Presumptive Negative Presumptive Negative    Benzodiazepines Screen, Urine Presumptive Negative Presumptive Negative    Cannabinoid Screen, Urine Presumptive Positive (A) Presumptive Negative    Cocaine Metabolite Screen, Urine Presumptive Negative Presumptive Negative    Fentanyl Screen, Urine Presumptive Negative Presumptive Negative    Opiate Screen, Urine Presumptive Negative Presumptive Negative    Oxycodone Screen, Urine Presumptive Negative Presumptive Negative    PCP Screen, Urine Presumptive Negative Presumptive Negative    Methadone Screen, Urine Presumptive Negative Presumptive  "Negative     Results are as expected.     Withdrawal Symptoms: none  Buprenorphine Side Effects: none        Agreement for Buprenorphine/(Naloxone) for the treatment of Opioid use  Reviewed Controlled Substance Agreement including but not limited to the benefits, risks, and alternatives to treatment with a Controlled Substance medication(s).   Date of the Last Agreement: 8/6/2024        Health Maintenance  No results found for: \"TBSIN\", \"PPD\"   No results found for: \"PREGTESTUR\", \"PREGSERUM\"  HIV 1 and 2 Screen   Date Value Ref Range Status   06/29/2023 NONREACTIVE NONREACTIVE Final     Comment:      HIV Ag/Ab screen is performed using the [employer]   HIV Ag/Ab Combo assay which detects the presence of HIV    p24 antigen as well as antibodies to HIV-1   (Group M and O) and HIV-2.  .  No laboratory evidence of HIV infection. If acute HIV infection is   suspected, consider testing for HIV RNA by PCR (viral load).       No results found for: \"LABRPR\", \"RPR\"  No results found for: \"HEPCAB\", \"HEPBCAB\", \"HEPAIGM\"    Patient is meeting requirements of buprenorphine program, including : abstinence from alcohol and other drugs , [facility] Help Meeting attendance , keeping appointments , and meeting functional/personal goals    Review of Systems   Constitutional:  Negative for activity change, chills and fatigue.   HENT:  Negative for congestion and rhinorrhea.    Eyes:  Negative for photophobia and visual disturbance.   Respiratory:  Negative for cough and chest tightness.    Cardiovascular:  Negative for chest pain and leg swelling.   Gastrointestinal:  Positive for constipation. Negative for abdominal distention, abdominal pain, nausea and vomiting.   Genitourinary:  Negative for flank pain.   Musculoskeletal:  Negative for back pain and neck pain.   Skin:  Negative for rash.   Neurological:  Negative for light-headedness and headaches.       Objective   There were no vitals taken for this visit.    Physical " Exam  Vitals and nursing note reviewed.   Constitutional:       General: He is not in acute distress.     Appearance: Normal appearance. He is not ill-appearing or toxic-appearing.   HENT:      Head: Normocephalic and atraumatic.      Nose: No congestion or rhinorrhea.      Mouth/Throat:      Mouth: Mucous membranes are moist.      Pharynx: Oropharynx is clear. No oropharyngeal exudate or posterior oropharyngeal erythema.   Eyes:      Extraocular Movements: Extraocular movements intact.      Conjunctiva/sclera: Conjunctivae normal.      Pupils: Pupils are equal, round, and reactive to light.   Cardiovascular:      Rate and Rhythm: Normal rate and regular rhythm.      Pulses: Normal pulses.      Heart sounds: Normal heart sounds, S1 normal and S2 normal. No murmur heard.     No friction rub. No gallop.   Pulmonary:      Effort: Pulmonary effort is normal. No respiratory distress.      Breath sounds: Normal breath sounds. No stridor. No wheezing, rhonchi or rales.   Abdominal:      General: Abdomen is flat. Bowel sounds are normal. There is no distension.      Palpations: Abdomen is soft.      Tenderness: There is no abdominal tenderness. There is no right CVA tenderness, left CVA tenderness, guarding or rebound.   Musculoskeletal:      Cervical back: Full passive range of motion without pain.      Right lower leg: No edema.      Left lower leg: No edema.   Skin:     General: Skin is warm and dry.   Neurological:      General: No focal deficit present.      Mental Status: He is alert and oriented to person, place, and time.      GCS: GCS eye subscore is 4. GCS verbal subscore is 5. GCS motor subscore is 6.      Cranial Nerves: No cranial nerve deficit.      Sensory: No sensory deficit.      Motor: No weakness, tremor or abnormal muscle tone.   Psychiatric:         Mood and Affect: Mood normal.         Assessment/Plan   Opioid use disorder, engaged in therapy    Patient doing overall well, tolerating buprenorphine  without difficulty, does note some constipation but seems manageable, has been trying Miralax with minimal success, will prescribe short course of stool softener for this. Plan to continue buprenorphine, will reengage perioperative analgesia strategies as we approach his surgery for his knee. Otherwise patient endorses motivation to continue buprenorphine therapy, per OARRS patient appears to have filled most recent script on 8/5, will send refill for September/ October and will see patient back in clinic November 5, patient will make earlier appointment if needed.    Buprenorphine Program:  Meeting requirements to continue program, for abstinence based OUD recovery goals.  Urine drug screens: Results are as expected.   OARRS: Results are as expected.   Recovery Activities: Buprenorphine maintenance therapy  Challenges: Upcoming orthopedic surgery with concern for appropriate analgesia    Risks for abuse of Buprenorphine/Naloxone and safety requirements  (hidden, locked area, out of reach of children and pets; Narcan prescription) are revisited yearly with CSA and as needed- no current concerns.    Benefits of treatment for relapse prevention outweigh risks associated with Buprenorphine.    Plan/Revision to Plan:  Continue current dose of Buprenorphine/Naloxone:   24 mg daily  OARRS at every appointment and as needed.  Drug screen random, quarterly and at other times as needed.  Meetings encouraged when safe and accessible.  Counselling available as needed.    Follow-up 11/5/2024 Buprenorphine Program

## 2024-09-04 ENCOUNTER — PHARMACY VISIT (OUTPATIENT)
Dept: PHARMACY | Facility: CLINIC | Age: 44
End: 2024-09-04
Payer: MEDICAID

## 2024-09-04 DIAGNOSIS — F11.90 OPIOID USE DISORDER: ICD-10-CM

## 2024-09-04 PROCEDURE — RXMED WILLOW AMBULATORY MEDICATION CHARGE

## 2024-09-04 RX ORDER — BUPRENORPHINE AND NALOXONE 8; 2 MG/1; MG/1
1 FILM, SOLUBLE BUCCAL; SUBLINGUAL 3 TIMES DAILY
Qty: 90 EACH | Refills: 1 | Status: SHIPPED | OUTPATIENT
Start: 2024-09-04 | End: 2024-10-04

## 2024-10-02 ENCOUNTER — PHARMACY VISIT (OUTPATIENT)
Dept: PHARMACY | Facility: CLINIC | Age: 44
End: 2024-10-02
Payer: MEDICAID

## 2024-10-02 PROCEDURE — RXMED WILLOW AMBULATORY MEDICATION CHARGE

## 2024-10-30 DIAGNOSIS — F11.90 OPIOID USE DISORDER: ICD-10-CM

## 2024-10-30 RX ORDER — BUPRENORPHINE AND NALOXONE 8; 2 MG/1; MG/1
1 FILM, SOLUBLE BUCCAL; SUBLINGUAL 3 TIMES DAILY
Qty: 90 EACH | Refills: 1 | Status: CANCELLED | OUTPATIENT
Start: 2024-10-30 | End: 2024-11-29

## 2024-10-31 DIAGNOSIS — F11.90 OPIOID USE DISORDER: ICD-10-CM

## 2024-10-31 PROCEDURE — RXMED WILLOW AMBULATORY MEDICATION CHARGE

## 2024-10-31 RX ORDER — BUPRENORPHINE AND NALOXONE 8; 2 MG/1; MG/1
1 FILM, SOLUBLE BUCCAL; SUBLINGUAL 3 TIMES DAILY
Qty: 90 EACH | Refills: 2 | Status: SHIPPED | OUTPATIENT
Start: 2024-10-31 | End: 2024-12-01

## 2024-11-01 ENCOUNTER — PHARMACY VISIT (OUTPATIENT)
Dept: PHARMACY | Facility: CLINIC | Age: 44
End: 2024-11-01
Payer: MEDICAID

## 2024-11-05 ENCOUNTER — HOSPITAL ENCOUNTER (OUTPATIENT)
Dept: EMERGENCY MEDICINE | Facility: HOSPITAL | Age: 44
Discharge: HOME | End: 2024-11-05
Payer: COMMERCIAL

## 2024-11-05 PROCEDURE — 99213 OFFICE O/P EST LOW 20 MIN: CPT | Performed by: EMERGENCY MEDICINE

## 2024-11-05 ASSESSMENT — ENCOUNTER SYMPTOMS
FEVER: 0
CHILLS: 0
HEADACHES: 0
ARTHRALGIAS: 1
NAUSEA: 0
JOINT SWELLING: 1
SHORTNESS OF BREATH: 0
DIARRHEA: 0
ABDOMINAL PAIN: 0

## 2024-11-05 NOTE — PROGRESS NOTES
Subjective   Alden Rothman is a 44 y.o. male who presents for routine Buprenorphine follow-up assessment.  Patient with hx OUD presenting for ongoing management of opioid use disorder, currently on buprenorphine. Endorses compliance with buprenorphine regimen, denies any breakthrough use cravings or withdrawal symptoms. Does note that he is back to work, has been working at [employer] usually 4pm-12am. Does note that he has been having increased acute on chronic knee pain since that time that he attributes to being on his feet more often for work. Denies any new trauma to the area. He notes a previously diagnosed meniscal tear that he follows with an orthopedic surgeon for but has thus far remained undecided on operative management.        OARRS:  No data recorded  I have personally reviewed the OARRS report for Alden Rothman. I have considered the risks of abuse, dependence, addiction and diversion    Urine Drug Screening  Last Urine Drug Screen: 07/24/24    Recent Results (from the past 8760 hours)   Drug Screen, Urine With Reflex to Confirmation    Collection Time: 07/24/24  4:26 PM   Result Value Ref Range    Amphetamine Screen, Urine Presumptive Negative Presumptive Negative    Barbiturate Screen, Urine Presumptive Negative Presumptive Negative    Benzodiazepines Screen, Urine Presumptive Negative Presumptive Negative    Cannabinoid Screen, Urine Presumptive Positive (A) Presumptive Negative    Cocaine Metabolite Screen, Urine Presumptive Negative Presumptive Negative    Fentanyl Screen, Urine Presumptive Negative Presumptive Negative    Opiate Screen, Urine Presumptive Negative Presumptive Negative    Oxycodone Screen, Urine Presumptive Negative Presumptive Negative    PCP Screen, Urine Presumptive Negative Presumptive Negative    Methadone Screen, Urine Presumptive Negative Presumptive Negative     Results are as expected.     Withdrawal Symptoms: none  Buprenorphine Side Effects: none        Agreement for  "Buprenorphine/(Naloxone) for the treatment of Opioid use  Reviewed Controlled Substance Agreement including but not limited to the benefits, risks, and alternatives to treatment with a Controlled Substance medication(s).   Date of the Last Agreement: 11/5/2024    Nicotine Use  smoker, not ready       Health Maintenance  No results found for: \"TBSIN\", \"PPD\"   No results found for: \"PREGTESTUR\", \"PREGSERUM\"  HIV 1 and 2 Screen   Date Value Ref Range Status   06/29/2023 NONREACTIVE NONREACTIVE Final     Comment:      HIV Ag/Ab screen is performed using the [employer]   HIV Ag/Ab Combo assay which detects the presence of HIV    p24 antigen as well as antibodies to HIV-1   (Group M and O) and HIV-2.  .  No laboratory evidence of HIV infection. If acute HIV infection is   suspected, consider testing for HIV RNA by PCR (viral load).       No results found for: \"LABRPR\", \"RPR\"  No results found for: \"HEPCAB\", \"HEPBCAB\", \"HEPAIGM\"    Patient is meeting requirements of buprenorphine program, including : abstinence from alcohol and other drugs , keeping appointments , and meeting functional/personal goals    Review of Systems   Constitutional:  Negative for chills and fever.   Eyes:  Negative for visual disturbance.   Respiratory:  Negative for shortness of breath.    Cardiovascular:  Negative for chest pain.   Gastrointestinal:  Negative for abdominal pain, diarrhea and nausea.   Musculoskeletal:  Positive for arthralgias and joint swelling. Negative for gait problem.   Neurological:  Negative for headaches.       Objective   There were no vitals taken for this visit.  Exam limited by virtual nature of visit:  Physical Exam  Constitutional:       General: He is not in acute distress.     Appearance: Normal appearance. He is not ill-appearing or toxic-appearing.      Comments: Pleasant, appropriately interactive   HENT:      Head: Normocephalic and atraumatic.   Neurological:      Mental Status: He is alert and oriented " to person, place, and time.      Cranial Nerves: No dysarthria or facial asymmetry.   Psychiatric:         Attention and Perception: Attention normal.         Mood and Affect: Mood normal.         Speech: Speech normal.         Behavior: Behavior is cooperative.         Thought Content: Thought content normal.         Cognition and Memory: Cognition normal.         Judgment: Judgment normal.         Assessment/Plan   Patient with history of OUD, stable on buprenorphine therapy. Plan to continue 8-2mg TID suboxone. Did discuss need to follow up with his orthopedic surgeon for acute on chronic knee pain, if patient elects for operative intervention will address bup regimen/ analgesia options at that time. Otherwise continues to demonstrate motivation for continued treatment, refill sent to pharmacy 10/31. Will see patient back in February given degree of stability on current regimen, will obtain UDS at that time.    Buprenorphine Program:  Meeting requirements to continue program, for abstinence based OUD recovery goals.  Urine drug screens: Results are as expected.   OARRS: Results are as expected.   Recovery Activities: Buprenorphine maintenance therapy  Challenges: Chronic pain    Risks for abuse of Buprenorphine/Naloxone and safety requirements  (hidden, locked area, out of reach of children and pets; Narcan prescription) are revisited yearly with [facility] and as needed- no current concerns.    Benefits of treatment for relapse prevention outweigh risks associated with Buprenorphine.    Plan/Revision to Plan:  Continue current dose of Buprenorphine/Naloxone:   24 mg daily  OARRS at every appointment and as needed.  Drug screen random, quarterly and at other times as needed.  Meetings encouraged when safe and accessible.  Counselling available as needed.    Follow-up 2/25/2025 Buprenorphine Program

## 2024-11-29 ENCOUNTER — PHARMACY VISIT (OUTPATIENT)
Dept: PHARMACY | Facility: CLINIC | Age: 44
End: 2024-11-29
Payer: MEDICAID

## 2024-11-29 PROCEDURE — RXMED WILLOW AMBULATORY MEDICATION CHARGE

## 2024-12-17 ENCOUNTER — LAB (OUTPATIENT)
Dept: LAB | Facility: LAB | Age: 44
End: 2024-12-17
Payer: COMMERCIAL

## 2024-12-17 DIAGNOSIS — F11.90 OPIOID USE DISORDER: ICD-10-CM

## 2024-12-17 PROCEDURE — 80307 DRUG TEST PRSMV CHEM ANLYZR: CPT

## 2024-12-17 PROCEDURE — 80349 CANNABINOIDS NATURAL: CPT

## 2024-12-17 NOTE — PROGRESS NOTES
Contacted by patient requesting information regarding where to provide urine drug screen sample. Patient with previously ordered UDS/ urine bup testing, is due for UDS this quarter. Referred to outpatient laboratory services.

## 2024-12-19 LAB
BUPRENORPHINE SCREEN, URINE: NORMAL NG/ML
DRUG SCREEN COMMENT UR-IMP: NORMAL

## 2024-12-21 LAB — CARBOXYTHC UR-MCNC: 23 NG/ML

## 2024-12-24 LAB
BUPRENORPHINE UR-MCNC: 130 NG/ML
BUPRENORPHINE UR-MCNC: <2 NG/ML
NALOXONE UR CFM-MCNC: <100 NG/ML
NORBUPRENORPHINE UR CFM-MCNC: 566 NG/ML
NORBUPRENORPHINE UR-MCNC: 63 NG/ML

## 2024-12-25 PROCEDURE — RXMED WILLOW AMBULATORY MEDICATION CHARGE

## 2024-12-26 ENCOUNTER — PHARMACY VISIT (OUTPATIENT)
Dept: PHARMACY | Facility: CLINIC | Age: 44
End: 2024-12-26
Payer: MEDICAID

## 2025-01-13 DIAGNOSIS — F11.90 OPIOID USE DISORDER: ICD-10-CM

## 2025-01-13 RX ORDER — BUPRENORPHINE AND NALOXONE 8; 2 MG/1; MG/1
1 FILM, SOLUBLE BUCCAL; SUBLINGUAL 3 TIMES DAILY
Qty: 90 EACH | Refills: 2 | Status: SHIPPED | OUTPATIENT
Start: 2025-01-24 | End: 2025-02-23

## 2025-01-17 ENCOUNTER — LAB (OUTPATIENT)
Dept: LAB | Facility: LAB | Age: 45
End: 2025-01-17
Payer: COMMERCIAL

## 2025-01-17 ENCOUNTER — OFFICE VISIT (OUTPATIENT)
Dept: GASTROENTEROLOGY | Facility: CLINIC | Age: 45
End: 2025-01-17
Payer: COMMERCIAL

## 2025-01-17 VITALS
HEART RATE: 74 BPM | TEMPERATURE: 98.5 F | DIASTOLIC BLOOD PRESSURE: 87 MMHG | BODY MASS INDEX: 23.19 KG/M2 | WEIGHT: 153 LBS | SYSTOLIC BLOOD PRESSURE: 127 MMHG | HEIGHT: 68 IN

## 2025-01-17 DIAGNOSIS — B18.2 CHRONIC HEPATITIS C WITHOUT HEPATIC COMA (MULTI): ICD-10-CM

## 2025-01-17 DIAGNOSIS — B18.2 CHRONIC HEPATITIS C WITHOUT HEPATIC COMA (MULTI): Primary | ICD-10-CM

## 2025-01-17 DIAGNOSIS — R19.4 CHANGE IN BOWEL HABITS: ICD-10-CM

## 2025-01-17 LAB
ALBUMIN SERPL BCP-MCNC: 4.4 G/DL (ref 3.4–5)
ALP SERPL-CCNC: 36 U/L (ref 33–120)
ALT SERPL W P-5'-P-CCNC: 66 U/L (ref 10–52)
ANION GAP SERPL CALC-SCNC: 13 MMOL/L (ref 10–20)
AST SERPL W P-5'-P-CCNC: 31 U/L (ref 9–39)
BASOPHILS # BLD AUTO: 0.06 X10*3/UL (ref 0–0.1)
BASOPHILS NFR BLD AUTO: 0.6 %
BILIRUB SERPL-MCNC: 0.4 MG/DL (ref 0–1.2)
BUN SERPL-MCNC: 13 MG/DL (ref 6–23)
CALCIUM SERPL-MCNC: 9.5 MG/DL (ref 8.6–10.6)
CHLORIDE SERPL-SCNC: 102 MMOL/L (ref 98–107)
CO2 SERPL-SCNC: 29 MMOL/L (ref 21–32)
CREAT SERPL-MCNC: 0.84 MG/DL (ref 0.5–1.3)
EGFRCR SERPLBLD CKD-EPI 2021: >90 ML/MIN/1.73M*2
EOSINOPHIL # BLD AUTO: 0.1 X10*3/UL (ref 0–0.7)
EOSINOPHIL NFR BLD AUTO: 1 %
ERYTHROCYTE [DISTWIDTH] IN BLOOD BY AUTOMATED COUNT: 13.1 % (ref 11.5–14.5)
GLUCOSE SERPL-MCNC: 88 MG/DL (ref 74–99)
HAV AB SER QL IA: REACTIVE
HBV CORE AB SER QL: NONREACTIVE
HBV SURFACE AB SER-ACNC: 4.9 MIU/ML
HBV SURFACE AG SERPL QL IA: NONREACTIVE
HCT VFR BLD AUTO: 42.8 % (ref 41–52)
HGB BLD-MCNC: 14.1 G/DL (ref 13.5–17.5)
IMM GRANULOCYTES # BLD AUTO: 0.03 X10*3/UL (ref 0–0.7)
IMM GRANULOCYTES NFR BLD AUTO: 0.3 % (ref 0–0.9)
LYMPHOCYTES # BLD AUTO: 2.59 X10*3/UL (ref 1.2–4.8)
LYMPHOCYTES NFR BLD AUTO: 25.3 %
MCH RBC QN AUTO: 27.8 PG (ref 26–34)
MCHC RBC AUTO-ENTMCNC: 32.9 G/DL (ref 32–36)
MCV RBC AUTO: 84 FL (ref 80–100)
MONOCYTES # BLD AUTO: 0.8 X10*3/UL (ref 0.1–1)
MONOCYTES NFR BLD AUTO: 7.8 %
NEUTROPHILS # BLD AUTO: 6.67 X10*3/UL (ref 1.2–7.7)
NEUTROPHILS NFR BLD AUTO: 65 %
NRBC BLD-RTO: 0 /100 WBCS (ref 0–0)
PLATELET # BLD AUTO: 355 X10*3/UL (ref 150–450)
POTASSIUM SERPL-SCNC: 4.1 MMOL/L (ref 3.5–5.3)
PROT SERPL-MCNC: 7.9 G/DL (ref 6.4–8.2)
RBC # BLD AUTO: 5.07 X10*6/UL (ref 4.5–5.9)
SODIUM SERPL-SCNC: 140 MMOL/L (ref 136–145)
WBC # BLD AUTO: 10.3 X10*3/UL (ref 4.4–11.3)

## 2025-01-17 PROCEDURE — 87902 NFCT AGT GNTYP ALYS HEP C: CPT

## 2025-01-17 PROCEDURE — 85025 COMPLETE CBC W/AUTO DIFF WBC: CPT

## 2025-01-17 PROCEDURE — 99214 OFFICE O/P EST MOD 30 MIN: CPT | Performed by: NURSE PRACTITIONER

## 2025-01-17 PROCEDURE — 87521 HEPATITIS C PROBE&RVRS TRNSC: CPT

## 2025-01-17 PROCEDURE — 86708 HEPATITIS A ANTIBODY: CPT

## 2025-01-17 PROCEDURE — 86704 HEP B CORE ANTIBODY TOTAL: CPT

## 2025-01-17 PROCEDURE — 99204 OFFICE O/P NEW MOD 45 MIN: CPT | Performed by: NURSE PRACTITIONER

## 2025-01-17 PROCEDURE — 86706 HEP B SURFACE ANTIBODY: CPT

## 2025-01-17 PROCEDURE — 3008F BODY MASS INDEX DOCD: CPT | Performed by: NURSE PRACTITIONER

## 2025-01-17 PROCEDURE — 83883 ASSAY NEPHELOMETRY NOT SPEC: CPT

## 2025-01-17 PROCEDURE — 84460 ALANINE AMINO (ALT) (SGPT): CPT

## 2025-01-17 PROCEDURE — 87340 HEPATITIS B SURFACE AG IA: CPT

## 2025-01-17 RX ORDER — VELPATASVIR AND SOFOSBUVIR 100; 400 MG/1; MG/1
1 TABLET, FILM COATED ORAL DAILY
Qty: 28 TABLET | Refills: 2 | Status: SHIPPED | OUTPATIENT
Start: 2025-01-17

## 2025-01-17 ASSESSMENT — ENCOUNTER SYMPTOMS
PALPITATIONS: 0
ABDOMINAL PAIN: 0
SHORTNESS OF BREATH: 0
ABDOMINAL DISTENTION: 0
COUGH: 0
APPETITE CHANGE: 0
COLOR CHANGE: 0
TREMORS: 0
HEADACHES: 0
VOMITING: 0
CONFUSION: 0
LIGHT-HEADEDNESS: 0
DYSURIA: 0
DIFFICULTY URINATING: 0
BLOOD IN STOOL: 0
CONSTIPATION: 0
CHILLS: 0
UNEXPECTED WEIGHT CHANGE: 0
FEVER: 0
WHEEZING: 0
AGITATION: 0
DIZZINESS: 0
NAUSEA: 0
JOINT SWELLING: 0
HEMATURIA: 0
DIARRHEA: 0
SLEEP DISTURBANCE: 0
BRUISES/BLEEDS EASILY: 0
TROUBLE SWALLOWING: 0
WEAKNESS: 0
NUMBNESS: 0
FREQUENCY: 0
VOICE CHANGE: 0

## 2025-01-17 ASSESSMENT — PAIN SCALES - GENERAL: PAINLEVEL_OUTOF10: 0-NO PAIN

## 2025-01-17 NOTE — PROGRESS NOTES
Patient ID: Alden Rothman is a 44 y.o. male who presents for hepatitis C.    HPI    44 year old with history of HCV.  Treatment naive.  Viral load 2,590,000.  Genotype unknown.   Risk factors include IV drug use and remains on suboxone.  PMH includes ADHD and depression, thought is not currently treated.      No liver disease in the family.  Weight stable.   His girlfriend is currently pregnant and due any day.    Denies history of jaundice, icterus, edema, bleeding or confusion.  ETOH intake none.    Family history of colon cancer (grandfather).  He has noticed more changes in bowels-possibly related to suboxone but is concerned about colon cancer.  Mother  young and he has no contact with father to know if there is other history of colon cancer.     No recent labs or imaging.     Review of Systems   Constitutional:  Negative for appetite change, chills, fever and unexpected weight change.   HENT:  Negative for mouth sores, nosebleeds, trouble swallowing and voice change.    Eyes:  Negative for visual disturbance.   Respiratory:  Negative for cough, shortness of breath and wheezing.    Cardiovascular:  Negative for chest pain, palpitations and leg swelling.   Gastrointestinal:  Negative for abdominal distention, abdominal pain, blood in stool, constipation, diarrhea, nausea and vomiting.   Genitourinary:  Negative for decreased urine volume, difficulty urinating, dysuria, frequency, hematuria and urgency.   Musculoskeletal:  Negative for gait problem and joint swelling.   Skin:  Negative for color change, pallor and rash.   Neurological:  Negative for dizziness, tremors, weakness, light-headedness, numbness and headaches.   Hematological:  Does not bruise/bleed easily.   Psychiatric/Behavioral:  Negative for agitation, behavioral problems, confusion and sleep disturbance.        Objective   Physical Exam  Constitutional:       General: He is awake.      Appearance: Normal appearance. He is well-developed.    HENT:      Head: Normocephalic and atraumatic.      Right Ear: Hearing normal.      Left Ear: Hearing normal.      Nose: Nose normal.      Mouth/Throat:      Lips: Pink.      Mouth: Mucous membranes are moist.   Eyes:      General: Lids are normal.      Extraocular Movements: Extraocular movements intact.      Conjunctiva/sclera: Conjunctivae normal.      Pupils: Pupils are equal, round, and reactive to light.   Cardiovascular:      Rate and Rhythm: Normal rate and regular rhythm.      Pulses: Normal pulses.      Heart sounds: Normal heart sounds.   Pulmonary:      Effort: Pulmonary effort is normal.      Breath sounds: Normal breath sounds.   Abdominal:      General: Abdomen is flat. Bowel sounds are normal.      Palpations: Abdomen is soft.   Musculoskeletal:      Cervical back: Normal range of motion and neck supple.   Feet:      Right foot:      Skin integrity: Skin integrity normal.      Left foot:      Skin integrity: Skin integrity normal.   Skin:     General: Skin is warm.   Neurological:      General: No focal deficit present.      Mental Status: He is alert and oriented to person, place, and time.      Cranial Nerves: Cranial nerves 2-12 are intact.      Sensory: Sensation is intact.      Motor: Motor function is intact.      Coordination: Coordination is intact.      Gait: Gait is intact.   Psychiatric:         Attention and Perception: Attention and perception normal.         Mood and Affect: Mood normal.         Speech: Speech normal.         Behavior: Behavior is cooperative.         Thought Content: Thought content normal.         Cognition and Memory: Cognition normal.         Judgment: Judgment normal.          Assessment/Plan   Problem List Items Addressed This Visit             ICD-10-CM    Chronic hepatitis C without hepatic coma (Multi) - Primary B18.2       Discussed the natural history of hepatitis C exposure risks, ways to prevent transmission to others, treatment options and cure rates.   Reviewed the importance of keeping all grooming tools including razors, toothbrushes and hairbrushes away from the reach of other.  Advised to avoid sexual contact in the setting of bleeding or genital sores or wounds.     Diet and exercise encouraged to avoid weight gain and potential fatty liver which may exacerbate liver disease.  Will update labs including work up for causes of chronic liver disease including inheritable, viral and autoimmune causes.  If required, will vaccinate against Hepatitis A and/or B if not immune.  Fibroscan and US to assess for advanced liver disease.   Once labs and imaging are completed, will start Epclusa x 12 weeks which will be sent to  Specialty Pharmacy.  Labs at 4 weeks, EOT and 3 months post treatment to confirm SVR.    FU in 6 weeks.              Relevant Medications    sofosbuvir-velpatasvir (Epclusa) 400-100 mg tablet    Other Relevant Orders    CBC and Auto Differential    Comprehensive Metabolic Panel    HCV PCR with Genotype Reflex    Hepatitis A Antibody, Total    Hepatitis B Core Antibody, Total    Hepatitis B Surface Antibody    Hepatitis B Surface Antigen    Hepatitis C Virus (HCV) FibroSure    Phosphatidylethanol (PEth), Whole Blood, Quantitative    Change in bowel habits R19.4    Relevant Orders    Colonoscopy Screening; Average Risk Patient            GINI Steele 01/17/25 1:33 PM

## 2025-01-17 NOTE — ASSESSMENT & PLAN NOTE
Discussed the natural history of hepatitis C exposure risks, ways to prevent transmission to others, treatment options and cure rates.  Reviewed the importance of keeping all grooming tools including razors, toothbrushes and hairbrushes away from the reach of other.  Advised to avoid sexual contact in the setting of bleeding or genital sores or wounds.     Diet and exercise encouraged to avoid weight gain and potential fatty liver which may exacerbate liver disease.  Will update labs including work up for causes of chronic liver disease including inheritable, viral and autoimmune causes.  If required, will vaccinate against Hepatitis A and/or B if not immune.  Fibroscan and US to assess for advanced liver disease.   Once labs and imaging are completed, will start Epclusa x 12 weeks which will be sent to  Specialty Pharmacy.  Labs at 4 weeks, EOT and 3 months post treatment to confirm SVR.    FU in 6 weeks.

## 2025-01-19 LAB
HCV RNA SERPL NAA+PROBE-ACNC: ABNORMAL IU/ML
HCV RNA SERPL NAA+PROBE-ACNC: DETECTED K[IU]/ML
HCV RNA SERPL NAA+PROBE-LOG IU: 6.78 LOG IU/ML
LABORATORY COMMENT REPORT: ABNORMAL

## 2025-01-20 ENCOUNTER — SPECIALTY PHARMACY (OUTPATIENT)
Dept: PHARMACY | Facility: CLINIC | Age: 45
End: 2025-01-20

## 2025-01-21 ENCOUNTER — TELEPHONE (OUTPATIENT)
Dept: GASTROENTEROLOGY | Facility: HOSPITAL | Age: 45
End: 2025-01-21
Payer: COMMERCIAL

## 2025-01-21 LAB
LABORATORY REPORT: NORMAL
PETH INTERPRETATION: NORMAL
PLPETH BLD-MCNC: <10 NG/ML
POPETH BLD-MCNC: <10 NG/ML

## 2025-01-22 LAB
A2 MACROGLOB SERPL-MCNC: 169 MG/DL (ref 110–276)
ALT SERPL W P-5'-P-CCNC: 79 IU/L (ref 0–55)
APO A-I SERPL-MCNC: 127 MG/DL (ref 101–178)
BILIRUB SERPL-MCNC: 0.2 MG/DL (ref 0–1.2)
FIBROSIS SCORING:: ABNORMAL
FIBROSIS STAGE SERPL QL: ABNORMAL
GGT SERPL-CCNC: 20 IU/L (ref 0–65)
HAPTOGLOB SERPL-MCNC: 109 MG/DL (ref 23–355)
INTERPRETATIONS:(HCVFS): ABNORMAL
LABORATORY COMMENT REPORT: ABNORMAL
LIVER FIBR SCORE SERPL CALC.FIBROSURE: 0.09 (ref 0–0.21)
NECROINFLAMM ACTIVITY SCORING:(HCVFS): ABNORMAL
NECROINFLAMMATORY ACT GRADE SERPL QL: ABNORMAL
NECROINFLAMMATORY ACT SCORE SERPL: 0.39 (ref 0–0.17)
SERVICE CMNT-IMP: ABNORMAL
TEST PERFORMANCE INFO SPEC: ABNORMAL

## 2025-01-24 ENCOUNTER — PHARMACY VISIT (OUTPATIENT)
Dept: PHARMACY | Facility: CLINIC | Age: 45
End: 2025-01-24
Payer: MEDICAID

## 2025-01-24 LAB — HEPATITIS C VIRAL RNA GENOTYPE LIPA: NORMAL

## 2025-01-24 PROCEDURE — RXMED WILLOW AMBULATORY MEDICATION CHARGE

## 2025-01-27 ENCOUNTER — SPECIALTY PHARMACY (OUTPATIENT)
Dept: PHARMACY | Facility: CLINIC | Age: 45
End: 2025-01-27

## 2025-01-27 PROCEDURE — RXMED WILLOW AMBULATORY MEDICATION CHARGE

## 2025-01-27 NOTE — PROGRESS NOTES
This patient has scheduled their medication delivery with  Specialty Pharmacy.  They should receive their medication 1/29/2025.

## 2025-01-28 ENCOUNTER — PHARMACY VISIT (OUTPATIENT)
Dept: PHARMACY | Facility: CLINIC | Age: 45
End: 2025-01-28
Payer: MEDICAID

## 2025-01-29 ENCOUNTER — SPECIALTY PHARMACY (OUTPATIENT)
Dept: PHARMACY | Facility: CLINIC | Age: 45
End: 2025-01-29

## 2025-01-29 DIAGNOSIS — B18.2 CHRONIC HEPATITIS C WITHOUT HEPATIC COMA (MULTI): Primary | ICD-10-CM

## 2025-01-29 NOTE — PROGRESS NOTES
[facility] Clinical Note  Initial Patient Education     Introduction  Alden Rothman is a 44 y.o. male who is on the specialty pharmacy service for management of: Hepatitis C Core.    Alden Rothman is initiating the following therapy: sofosbuvir-velpatasvir (Epclusa) 400-100 mg tablet - Take 1 tablet by mouth once daily    Medication receipt date: 1/29/25  Duration of therapy: 12 weeks    The most recent encounter visit with the referring prescriber GINI Steele on 1/17/25 was reviewed.  Pharmacy will continue to collaborate in the care of this patient with the referring prescriber.    Clinical Background  An initial assessment was conducted prior to first fill of the medication to determine the appropriateness of therapy given the patient's diagnosis, medication list, comorbidities, allergies, medical history, patient's ability to self administer medication, and therapeutic goals based on possible outcomes of therapy. Refer to initial assessment task completed on 1/27/25.    Labs for clinical appropriateness that were reviewed include:   Hepatology -   Lab Results   Component Value Date    HCVPCRQUANT 5,960,000 (H) 01/17/2025    HCVGENO 1a 01/17/2025    AST 31 01/17/2025    ALT 66 (H) 01/17/2025    ALKPHOS 36 01/17/2025    HEPBCAB Nonreactive 01/17/2025     Lab Results   Component Value Date    FIBROSSCORE 0.09 01/17/2025    FIBROSSTAGE Comment 01/17/2025       Education/Discussion  Alden was contacted on 1/29/2025 at 9:40 AM for a pharmacy visit with encounter number [id number] from:   [facility]  [address] [place] [id number]  Dept: [phone]  Dept Fax: [phone]  Alden consented to a/an Telephone visit, which was performed.    Medication Start Date (planned or actual): 1/29/25  Education was conducted prior to start of therapy? Yes    Education discussed includes the following:  Patient Education  Counseled the Patient  on the Following : Theraputic rationale and expected outcomes, Expected duration of therapy, Doses and administration, Possible side effects and management, Adherence and missed doses, Possible drug interactions, Lab monitoring and follow-up, Safe handling, storage, and disposal, Pharmacy contact information  Learner: Patient  Education Method: Explanation  Education Response: Verbalizes understanding  Additional details of the medication specific counseling are found within the linked patient education flowsheet.     The follow up timeline was discussed. Every person responds to and reacts to therapy differently. Patient should be assessed for efficacy and tolerability in approximately: 12 weeks post therapy completion    Provided education on goals and possible outcomes of therapy:  Adherence with therapy  Timely completion of appropriate labs  Timely and appropriate follow up with provider  Identify and address medication interactions with presciption medications, OTC medications and supplements  Optimize or maintain quality of life  Hepatology - HCV: Achieve SVR 12 (complete clearance of Hepatitis C through undetected HCV RNA 12 weeks post therapy completion)  Identify and address medication interactions with prescription medications, OTC medications and supplements   Prevent re-infection and transmission of HCV     The importance of adherence was discussed and they were advised to take the medication as prescribed by their provider.     Impression/Plan  Review and Assessment   Reviewed During This Encounter: Medications  Medications Assessed for Appropriate Use, Dose, Route, Frequency, and Duration: Yes  Medication Reconciliation Completed: Yes  Drug Interactions Evaluated: Yes  Clinically Relevant Drug Interactions Identified: No    This patient has not been identified as high risk due to Lack of high risk qualifiers.  The following action was taken: N/A.    QOL/Patient Satisfaction  Rate your quality of life  on scale of 1-10: 9  Rate your satisfaction with  Specialty Pharmacy on scale of 1-10: 10 - Completely satisfied    The  Specialty Pharmacy Welcome packet may be viewed here:   Specialty Pharmacy Welcome Packet     Or by scanning QR code:      Provided contact information ([phone]) for [facility] and reviewed dispensing process, refill timeline and patient management follow up. Advised to contact the pharmacy if there are any adverse effects and/or changes to medication list, including prescriptions, OTC medications, herbal products, or supplements. Confirmed understanding of education conducted during assessment. All questions and concerns were addressed and patient was encouraged to reach out for additional questions or concerns.    Medication start date: 1/29/25  Therapy completion: 4/23/25  Anticipated SVR date: 7/16/25        Abhishek BOURGEOISD

## 2025-01-29 NOTE — TELEPHONE ENCOUNTER
Reviewed patient with resident. The patient is planning to have a colonoscopy in the next month and will be prepping with Miralax/Dulocolax (no interaction with Epclusa). The patient was advised to discuss the bowel prep and med holding instructions with his GI provider.

## 2025-02-10 ENCOUNTER — SPECIALTY PHARMACY (OUTPATIENT)
Dept: PHARMACY | Facility: CLINIC | Age: 45
End: 2025-02-10

## 2025-02-13 ENCOUNTER — PATIENT MESSAGE (OUTPATIENT)
Dept: GASTROENTEROLOGY | Facility: CLINIC | Age: 45
End: 2025-02-13
Payer: COMMERCIAL

## 2025-02-13 DIAGNOSIS — R19.4 CHANGE IN BOWEL HABITS: Primary | ICD-10-CM

## 2025-02-14 RX ORDER — POLYETHYLENE GLYCOL 3350 17 G/17G
238 POWDER, FOR SOLUTION ORAL SEE ADMIN INSTRUCTIONS
Qty: 238 G | Refills: 0 | Status: SHIPPED | OUTPATIENT
Start: 2025-02-14

## 2025-02-18 PROCEDURE — RXMED WILLOW AMBULATORY MEDICATION CHARGE

## 2025-02-20 ENCOUNTER — PHARMACY VISIT (OUTPATIENT)
Dept: PHARMACY | Facility: CLINIC | Age: 45
End: 2025-02-20
Payer: MEDICAID

## 2025-02-21 ENCOUNTER — PHARMACY VISIT (OUTPATIENT)
Dept: PHARMACY | Facility: CLINIC | Age: 45
End: 2025-02-21
Payer: MEDICAID

## 2025-02-21 PROCEDURE — RXMED WILLOW AMBULATORY MEDICATION CHARGE

## 2025-02-25 ENCOUNTER — HOSPITAL ENCOUNTER (OUTPATIENT)
Dept: EMERGENCY MEDICINE | Facility: HOSPITAL | Age: 45
Discharge: HOME | End: 2025-02-25
Payer: COMMERCIAL

## 2025-02-25 PROCEDURE — 99214 OFFICE O/P EST MOD 30 MIN: CPT | Performed by: EMERGENCY MEDICINE

## 2025-02-25 ASSESSMENT — ENCOUNTER SYMPTOMS
ARTHRALGIAS: 0
DYSURIA: 0
VOMITING: 0
FLANK PAIN: 0
PHOTOPHOBIA: 0
MYALGIAS: 0
FATIGUE: 0
NAUSEA: 0
FEVER: 0
SHORTNESS OF BREATH: 0
PALPITATIONS: 0
VOICE CHANGE: 0
COUGH: 0
CHILLS: 0
CHEST TIGHTNESS: 0
TROUBLE SWALLOWING: 0

## 2025-02-25 NOTE — PROGRESS NOTES
Subjective   Alden Rothman is a 44 y.o. male who presents for routine Buprenorphine follow-up assessment.  Patient presenting for ongoing assessment and management of opioid use disorder. Patient endorses good compliance with home buprenorphine regimen, denies any withdrawal symptoms/ cravings. Recently his girlfriend delivered their baby daughter, patient states that he has been doing well with childcare at home.   Does note noticing a lesion on his tongue, noted a bump in the center of his tongue 1-2 years prior but developed a whitish patch behind the bump, he is unsure how long it has been present, also notes some pain/ swelling along left lateral cheek. He does have an appointment with ENT on 3/7.   Patient is slated for a colonoscopy April 3, he did have questions regarding continuation of suboxone prior to the procedure.       OARRS:  No data recorded  I have personally reviewed the OARRS report for Alden Rothmna. I have considered the risks of abuse, dependence, addiction and diversion    Urine Drug Screening  Last Urine Drug Screen: 12/17/24    Recent Results (from the past 8760 hours)   Buprenorphine Confirm,Urine    Collection Time: 12/17/24  2:56 PM   Result Value Ref Range    BUPRENORPHINE GLUC, URINE 130 ng/mL    BUPRENORPHINE ,URINE <2 ng/mL    NALOXONE, URINE <100 ng/mL    NORBUPRENORPHINE GLUC,URINE 566 ng/mL    NORBUPRENORPHINE, URINE 63 ng/mL   Drug Screen, Urine With Reflex to Confirmation    Collection Time: 12/17/24  2:56 PM   Result Value Ref Range    Amphetamine Screen, Urine Presumptive Negative Presumptive Negative    Barbiturate Screen, Urine Presumptive Negative Presumptive Negative    Benzodiazepines Screen, Urine Presumptive Negative Presumptive Negative    Cannabinoid Screen, Urine Presumptive Positive (A) Presumptive Negative    Cocaine Metabolite Screen, Urine Presumptive Negative Presumptive Negative    Fentanyl Screen, Urine Presumptive Negative Presumptive Negative    Opiate  "Screen, Urine Presumptive Negative Presumptive Negative    Oxycodone Screen, Urine Presumptive Negative Presumptive Negative    PCP Screen, Urine Presumptive Negative Presumptive Negative    Methadone Screen, Urine Presumptive Negative Presumptive Negative     Results are as expected.     Withdrawal Symptoms: none  Buprenorphine Side Effects: none      Agreement for Buprenorphine/(Naloxone) for the treatment of Opioid use  Reviewed Controlled Substance Agreement including but not limited to the benefits, risks, and alternatives to treatment with a Controlled Substance medication(s).   Date of the Last Agreement: 2/25/2025    Nicotine Use  smoker, contemplative, interested in chantix/ bupropion (endorses success with wellbutrin in the past) notes nicotine patches make him nauseated.    Health Maintenance  No results found for: \"TBSIN\", \"PPD\"   No results found for: \"PREGTESTUR\", \"PREGSERUM\"  HIV 1 and 2 Screen   Date Value Ref Range Status   06/29/2023 NONREACTIVE NONREACTIVE Final     Comment:      HIV Ag/Ab screen is performed using the Siemens [employer]   HIV Ag/Ab Combo assay which detects the presence of HIV    p24 antigen as well as antibodies to HIV-1   (Group M and O) and HIV-2.  .  No laboratory evidence of HIV infection. If acute HIV infection is   suspected, consider testing for HIV RNA by PCR (viral load).       No results found for: \"LABRPR\", \"RPR\"  Hepatitis B Core AB- Total   Date Value Ref Range Status   01/17/2025 Nonreactive Nonreactive Final       Patient is meeting requirements of buprenorphine program, including : abstinence from alcohol and other drugs , keeping appointments , and meeting functional/personal goals      Review of Systems   Constitutional:  Negative for chills, fatigue and fever.   HENT:  Positive for mouth sores. Negative for trouble swallowing and voice change.    Eyes:  Negative for photophobia and visual disturbance.   Respiratory:  Negative for cough, chest tightness and " shortness of breath.    Cardiovascular:  Negative for chest pain, palpitations and leg swelling.   Gastrointestinal:  Negative for nausea and vomiting.   Genitourinary:  Negative for dysuria and flank pain.   Musculoskeletal:  Negative for arthralgias and myalgias.       Objective   There were no vitals taken for this visit.    Physical Exam  Vitals and nursing note reviewed.   Constitutional:       General: He is not in acute distress.     Appearance: Normal appearance. He is not ill-appearing or toxic-appearing.   HENT:      Head: Normocephalic and atraumatic.      Nose: No congestion or rhinorrhea.      Mouth/Throat:      Mouth: Mucous membranes are moist.      Pharynx: Oropharynx is clear. No oropharyngeal exudate or posterior oropharyngeal erythema.     Eyes:      Extraocular Movements: Extraocular movements intact.      Right eye: Normal extraocular motion.      Left eye: Normal extraocular motion.      Conjunctiva/sclera: Conjunctivae normal.      Pupils: Pupils are equal, round, and reactive to light.   Cardiovascular:      Rate and Rhythm: Normal rate and regular rhythm.      Pulses: Normal pulses.      Heart sounds: Normal heart sounds, S1 normal and S2 normal. No murmur heard.     No friction rub. No gallop.   Pulmonary:      Effort: Pulmonary effort is normal. No respiratory distress.      Breath sounds: Normal breath sounds. No stridor. No wheezing, rhonchi or rales.   Abdominal:      General: Abdomen is flat. Bowel sounds are normal. There is no distension.      Palpations: Abdomen is soft.      Tenderness: There is no abdominal tenderness. There is no right CVA tenderness, left CVA tenderness, guarding or rebound.   Musculoskeletal:      Cervical back: Full passive range of motion without pain.      Right lower leg: No edema.      Left lower leg: No edema.   Skin:     General: Skin is warm and dry.   Neurological:      General: No focal deficit present.      Mental Status: He is alert and oriented to  person, place, and time.      GCS: GCS eye subscore is 4. GCS verbal subscore is 5. GCS motor subscore is 6.      Cranial Nerves: No cranial nerve deficit.      Sensory: No sensory deficit.      Motor: No weakness, tremor or abnormal muscle tone.   Psychiatric:         Mood and Affect: Mood normal.         Assessment/Plan   Patient with history of opioid use disorder, currently on suboxone maintenance therapy, doing well on buprenorphine. No red flags for diversion, patient denies any breakthrough use/ cravings and no adverse effects of buprenorphine, will plan to continue current regimen.  In regards to tongue lesion noted by patient, did discuss that this is a concerning lesion and impressed upon the patient the importance of maintaining his ENT appointment on 3/7, he appears to have appropriate outpatient follow up for this. Did discuss smoking cessation with him, he conveys that due to increased stress around evaluating the tongue lesion as well as a new baby at home he does not feel ready to quit but is amenable to reassessing pharmacologic interventions for smoking cessation if there is concern for malignancy per his ENT evaluation.   Patient does have a colonoscopy scheduled 4/3, did discuss continuation of buprenorphine through the procedure and did instruct patient to discuss suboxone use with the performing physician.    Buprenorphine Program:  Meeting requirements to continue program, for abstinence based OUD recovery goals.  Urine drug screens: Results are as expected.   OARRS: Results are as expected.   Recovery Activities: Buprenorphine maintenance therapy  Challenges: None    Risks for abuse of Buprenorphine/Naloxone and safety requirements  (hidden, locked area, out of reach of children and pets; Narcan prescription) are revisited yearly with CSA and as needed- no current concerns.    Benefits of treatment for relapse prevention outweigh risks associated with Buprenorphine.    Plan/Revision to  Plan:  Continue current dose of Buprenorphine/Naloxone:   24 mg daily  OARRS at every appointment and as needed.  Drug screen random, quarterly and at other times as needed.  Meetings encouraged when safe and accessible.  Counselling available as needed.    Follow-up 4/22/2025  Buprenorphine Program

## 2025-03-06 ENCOUNTER — SPECIALTY PHARMACY (OUTPATIENT)
Dept: PHARMACY | Facility: HOSPITAL | Age: 45
End: 2025-03-06
Payer: COMMERCIAL

## 2025-03-06 NOTE — PROGRESS NOTES
[facility] Clinical Note  Patient Reassessment     Introduction  Alden Rothman is a 44 y.o. male who is on the specialty pharmacy service for management of: Hepatitis C Core.      [facility] supplied medication: Epclusa 400-100 mg, 1 tab po daily    Duration of therapy: 12 weeks    The most recent encounter visit with the referring prescriber Dottie Morrow on 1/17/25 was reviewed.  Pharmacy will continue to collaborate in the care of this patient with the referring prescriber.    Discussion  Alden was contacted on 3/7/2025 at 1:11 PM for a pharmacy visit with encounter number [id number] from:   [facility]  [address]  [facility] [address]  [place] [address]  Dept: [phone]  Dept Fax: [phone]  Loc: [phone]  Alden consented to a/an Telephone visit, which was performed.    Efficacy  Patient has developed new symptoms of condition: No  Patient/caregiver feels medication is affecting the disease state: patient feels the same; he has not yet completed his labwork    Goals  Provided education on goals and possible outcomes of therapy:  Adherence with therapy  Timely completion of appropriate labs  Timely and appropriate follow up with provider  Optimize or maintain quality of life  Hepatology - HCV: Achieve SVR 12 (complete clearance of Hepatitis C through undetected HCV RNA 12 weeks post therapy completion)  Identify and address medication interactions with prescription medications, OTC medications and supplements   Prevent re-infection and transmission of HCV   Patient has documented target(s) for goals of therapy: Yes    Tolerance  Patient has experienced side effects from this medication: No  Changes to current therapy regimen: No    The follow-up timeline was discussed. Every person responds to and reacts to therapy differently. Patient should be assessed for efficacy and tolerability in approximately: 12 weeks post therapy  completion    Adherence  Patient Information  Informant: Self (Patient)  Demonstrates Understanding of Importance of Adherence: Yes  Does the patient have any barriers to self-administration (including physical and mental?): No  Medication Information  Medication: sofosbuvir/velpatasvir (Epclusa)  Patient Reported Missed Doses in the Last 4 Weeks: 0  Estimated Medication Adherence Level: %  Barriers to Adherence: No Problems identified   The importance of adherence was discussed and patient/caregiver was advised to take the medication as prescribed by their provider. Encouraged patient/caregiver to call physician's office or specialty pharmacy if they have a question regarding a missed dose.    General Assessment  Changes to home medications, OTCs or supplements: No  Current Outpatient Medications   Medication Sig Dispense Refill    buprenorphine-naloxone (Suboxone) 8-2 mg SL film Place 1 Film under the tongue 3 times a day 90 each 2    clotrimazole (Mycelex) 10 mg boy Take 1 tablet (10 mg) by mouth 5 times a day for 14 days. 70 Boy 0    ibuprofen 800 mg tablet Take 1 tablet by mouth 3 times a day as needed. (Patient not taking: Reported on 3/7/2025)      polyethylene glycol (Miralax) 17 gram/dose powder Mix 238 g of powder and drink see administration instructions. Take per bowel prep instructions (Patient not taking: Mix of powder and drink. Reported on 3/7/2025) 238 g 0    sofosbuvir-velpatasvir (Epclusa) 400-100 mg tablet Take 1 tablet by mouth once daily. 28 tablet 2     No current facility-administered medications for this visit.     Reported new allergies: No  Reported new medical conditions: Yes - patient has a lump in his throat; he has an appointment with ENT scheduled for 3/7/25  Additional monitoring reviewed: Hepatology - Hepatitis C Virus Mid Therapy Labs:   Lab Results   Component Value Date    HCVRNAPCRLOG 6.78 01/17/2025    HCVPCRQUANT 5,960,000 (H) 01/17/2025      Lab Results  "  Component Value Date     01/17/2025    K 4.1 01/17/2025     01/17/2025    CO2 29 01/17/2025    BUN 13 01/17/2025    CREATININE 0.84 01/17/2025    EGFR >90 01/17/2025    CALCIUM 9.5 01/17/2025    ALBUMIN 4.4 01/17/2025    PROT 7.9 01/17/2025    BILITOT 0.4 01/17/2025    ALKPHOS 36 01/17/2025    ALT 66 (H) 01/17/2025    AST 31 01/17/2025    GLUCOSE 88 01/17/2025      Lab Results   Component Value Date    HEPBCAB Nonreactive 01/17/2025    No results found for: \"HBVDNAQNPCRL\"   Is laboratory follow up needed? Yes - mid-therapy labs, patient is planning to complete on 3/7/25    Advised to contact the pharmacy if there are any changes to the patient's medication list, including prescriptions, OTC medications, herbal products, or supplements.    Impression/Plan  This patient has not been identified as high risk due to Lack of high risk qualifiers.  The following action was taken: N/A.    QOL/Patient Satisfaction  Rate your quality of life on scale of 1-10: 8  Rate your satisfaction with  Specialty Pharmacy on scale of 1-10: 10 - Completely satisfied    Provided contact information ([phone]) for [facility] and reviewed dispensing process, refill timeline and patient management follow up. Confirmed understanding of education conducted during assessment. All questions and concerns were addressed and patient/caregiver was encouraged to reach out for additional questions or concerns.    Based on the patient's diagnosis, medication list, progress towards goals, adherence, tolerance, and medication list, medication remains appropriate: Therapy remains appropriate (I attest)    Rosa Prasad, PharmD     "

## 2025-03-07 ENCOUNTER — LAB (OUTPATIENT)
Dept: LAB | Facility: CLINIC | Age: 45
End: 2025-03-07
Payer: COMMERCIAL

## 2025-03-07 ENCOUNTER — OFFICE VISIT (OUTPATIENT)
Dept: OTOLARYNGOLOGY | Facility: CLINIC | Age: 45
End: 2025-03-07
Payer: COMMERCIAL

## 2025-03-07 VITALS — WEIGHT: 160.5 LBS | HEIGHT: 67 IN | BODY MASS INDEX: 25.19 KG/M2

## 2025-03-07 DIAGNOSIS — R09.A2 GLOBUS SENSATION: ICD-10-CM

## 2025-03-07 DIAGNOSIS — K21.9 LARYNGOPHARYNGEAL REFLUX (LPR): Primary | ICD-10-CM

## 2025-03-07 DIAGNOSIS — K14.8 TONGUE LESION: ICD-10-CM

## 2025-03-07 DIAGNOSIS — B18.2 CHRONIC HEPATITIS C WITHOUT HEPATIC COMA (MULTI): ICD-10-CM

## 2025-03-07 DIAGNOSIS — R07.0 THROAT DISCOMFORT: ICD-10-CM

## 2025-03-07 PROCEDURE — 84075 ASSAY ALKALINE PHOSPHATASE: CPT

## 2025-03-07 PROCEDURE — 99213 OFFICE O/P EST LOW 20 MIN: CPT | Mod: 25

## 2025-03-07 PROCEDURE — 99203 OFFICE O/P NEW LOW 30 MIN: CPT

## 2025-03-07 PROCEDURE — 31575 DIAGNOSTIC LARYNGOSCOPY: CPT

## 2025-03-07 PROCEDURE — 87522 HEPATITIS C REVRS TRNSCRPJ: CPT

## 2025-03-07 PROCEDURE — 3008F BODY MASS INDEX DOCD: CPT

## 2025-03-07 PROCEDURE — 36415 COLL VENOUS BLD VENIPUNCTURE: CPT

## 2025-03-07 RX ORDER — CLOTRIMAZOLE 10 MG/1
10 LOZENGE ORAL
Qty: 70 TROCHE | Refills: 0 | Status: SHIPPED | OUTPATIENT
Start: 2025-03-07 | End: 2025-03-21

## 2025-03-07 NOTE — PROGRESS NOTES
Reason For Consult  Tongue lesion, frequent clearing of throat, throat discomfort    HISTORY OF PRESENT ILLNESS:   Alden Rothman, who is a 44 y.o. male presenting for an initial visit for Tongue lesion, frequent clearing of throat, throat discomfort.  The patient reports that he has had a bump on the midline of his anterior tongue for the past 2 years with a white patch behind it. Patient reports the bump is painless and when brushing teeth, will bleed occasionally. Patient currently under immunological treatment for Hep C, and reports that he will have swollen glands in the throat. Patient reports frequent clearing of the throat. Patient denies any voice changes. Denies any shortness of breath. Patient reports occasional increased effort when swallowing. Patient reports symptoms of acid reflux in past and reports diet with frequent pizza and energy drinks. Patient reports trying to cut back on smoking cigarettes and currently vaping.     Past Medical History  He has a past medical history of Opioid use disorder.  Surgical History  He has no past surgical history on file.     Social History  He reports that he has been smoking cigarettes. He has been exposed to tobacco smoke. He has never used smokeless tobacco. He reports that he does not currently use alcohol. He reports that he does not currently use drugs after having used the following drugs: Heroin, Fentanyl, and Marijuana.    Allergies  Penicillin and Quetiapine    Review of Systems  All 10 systems were reviewed and negative except for above.      Physical Exam  CONSTITUTIONAL: Well developed, well nourished.    VOICE: normal  RESPIRATION: Breathing comfortably, no stridor.    NEURO: Alert and oriented x3, cranial nerves II-XII intact and symmetric bilaterally.    EARS: Normal external ears, external auditory canals, normal hearing to conversational voice.    NOSE: External nose midline, anterior rhinoscopy is normal with limited visualization to the  "anterior aspect of the interior turbinates. No lesions noted.     ORAL CAVITY/OROPHARYNX/LIPS: Normal mucous membranes, normal floor of mouth/tongue/OP, no masses or lesions are noted.    SKIN: Neck skin is intact  PSYCH: Alert and oriented with appropriate mood and affect.        Last Recorded Vitals  Height 1.702 m (5' 7\"), weight 72.8 kg (160 lb 8 oz).    Procedure  PROCEDURE NOTE:  Recommended flexible laryngoscopy/stroboscopy.  Risks, benefits,  and alternatives were explained.  They wish to proceed and provide verbal consent.     PROCEDURE:  Flexible Laryngoscopy, CPT 08761   POSTPROCEDURE DIAGNOSIS: LPR, globus sensation    INDICATIONS: Inability to tolerate mirror exam or abnormal findings on mirror, Flexible Laryngoscopy/Stroboscopy performed to assess one of the followin. Diagnosis of symptomatic disorder involving the voice, swallow, upper aerodigestive tract, including ANNE-MARIE disorders, or  2. Preoperative evaluation of vocal cord function for individuals undergoing surgery where the RLN or vagus nerves are at risk of injury, or  3. Further evaluation of abnormalities of the upper aerodigestive tract discovered by another modality, such as CT, MRI, bronchoscopy or EGD    Description of Procedure:    After adequate afrin and lidocaine spray, I advanced the endoscope.  Visualization of the nasopharynx, vallecula, posterior pharyngeal walls, pyriform, epiglottis and post cricoid areas was unremarkable.  The following laryngeal findings were noted:    vocal cord movement was normal   closure was complete  Compression was increased AP >FVC   edema and erythema- mild bilateral vc  interarytenoid edema present  Laryngeal lesions were none  the subglottis was widely patent  Pharyngeal wall squeeze was normal    Procedure well tolerated.       ASSESSMENT AND PLAN:   This is an initial visit for LPR, tongue lesion, globus sensation and  with clinical findings notable for normal vc movement and closure. No masses " or lesions of larynx. Will review images of tongue exam with surgeon to discuss if biopsy warranted. Patient currently cannot use Acid reflux medication due to undergoing immunological treatment for Hep C. Discussed diet factors that are contributing to the LPR and globus sensation. Will call patient after speaking with surgeon regarding tongue lesion. Patient agreeable to plan. All questions answered.

## 2025-03-08 LAB
ALBUMIN SERPL BCP-MCNC: 4.7 G/DL (ref 3.4–5)
ALP SERPL-CCNC: 37 U/L (ref 33–120)
ALT SERPL W P-5'-P-CCNC: 14 U/L (ref 10–52)
AST SERPL W P-5'-P-CCNC: 18 U/L (ref 9–39)
BILIRUB DIRECT SERPL-MCNC: 0 MG/DL (ref 0–0.3)
BILIRUB SERPL-MCNC: 0.3 MG/DL (ref 0–1.2)
PROT SERPL-MCNC: 7.9 G/DL (ref 6.4–8.2)

## 2025-03-09 LAB
HCV RNA SERPL NAA+PROBE-ACNC: NOT DETECTED K[IU]/ML
HCV RNA SERPL NAA+PROBE-LOG IU: NORMAL {LOG_IU}/ML

## 2025-03-17 ENCOUNTER — SPECIALTY PHARMACY (OUTPATIENT)
Dept: PHARMACY | Facility: CLINIC | Age: 45
End: 2025-03-17

## 2025-03-17 PROCEDURE — RXMED WILLOW AMBULATORY MEDICATION CHARGE

## 2025-03-18 ENCOUNTER — PHARMACY VISIT (OUTPATIENT)
Dept: PHARMACY | Facility: CLINIC | Age: 45
End: 2025-03-18
Payer: MEDICAID

## 2025-03-21 ENCOUNTER — PHARMACY VISIT (OUTPATIENT)
Dept: PHARMACY | Facility: CLINIC | Age: 45
End: 2025-03-21
Payer: MEDICAID

## 2025-03-21 PROCEDURE — RXMED WILLOW AMBULATORY MEDICATION CHARGE

## 2025-03-25 PROCEDURE — RXMED WILLOW AMBULATORY MEDICATION CHARGE

## 2025-03-27 ENCOUNTER — PHARMACY VISIT (OUTPATIENT)
Dept: PHARMACY | Facility: CLINIC | Age: 45
End: 2025-03-27
Payer: MEDICAID

## 2025-04-12 ENCOUNTER — SPECIALTY PHARMACY (OUTPATIENT)
Dept: PHARMACY | Facility: CLINIC | Age: 45
End: 2025-04-12

## 2025-04-14 ENCOUNTER — PROCEDURE VISIT (OUTPATIENT)
Dept: OTOLARYNGOLOGY | Facility: HOSPITAL | Age: 45
End: 2025-04-14
Payer: COMMERCIAL

## 2025-04-14 VITALS — BODY MASS INDEX: 26.21 KG/M2 | TEMPERATURE: 98.4 F | WEIGHT: 167 LBS | HEIGHT: 67 IN

## 2025-04-14 DIAGNOSIS — J39.2 LESION OF PHARYNX: ICD-10-CM

## 2025-04-14 DIAGNOSIS — K14.8 TONGUE LESION: ICD-10-CM

## 2025-04-14 PROCEDURE — 88305 TISSUE EXAM BY PATHOLOGIST: CPT | Mod: TC,SUR | Performed by: OTOLARYNGOLOGY

## 2025-04-14 ASSESSMENT — PAIN SCALES - GENERAL: PAINLEVEL_OUTOF10: 0-NO PAIN

## 2025-04-14 ASSESSMENT — PATIENT HEALTH QUESTIONNAIRE - PHQ9
SUM OF ALL RESPONSES TO PHQ9 QUESTIONS 1 & 2: 0
1. LITTLE INTEREST OR PLEASURE IN DOING THINGS: NOT AT ALL
2. FEELING DOWN, DEPRESSED OR HOPELESS: NOT AT ALL

## 2025-04-14 NOTE — PROGRESS NOTES
ASSESSMENT AND PLAN:   Alden Rothman is a 44 y.o. male presenting for an initial visit with a history of midline tongue lesion and left retromolar trigone lesion. The patient is here today for evaluation. He has had this for some time and wished to have biopsy. Silver nitrate used in both locations after biopsy was done. This appears to be pyogenic granuloma or possible RRP.     The patient can use NSAIDs such as Tylenol or Motrin for pain. Recommend ice water for any bleeding; this will also help with pain relief. Path in We will see the patient back in 2 weeks with a virtual visit for pathology results.          Reason For Consult  Lesions of the tongue    Referral from FERMIN Harris for concern of lesion on the tongue. From his note of 3/7/25:  This is an initial visit for LPR, tongue lesion, globus sensation and  with clinical findings notable for normal vc movement and closure. No masses or lesions of larynx. Will review images of tongue exam with surgeon to discuss if biopsy warranted. Patient currently cannot use Acid reflux medication due to undergoing immunological treatment for Hep C. Discussed diet factors that are contributing to the LPR and globus sensation. Will call patient after speaking with surgeon regarding tongue lesion. Patient agreeable to plan. All questions answered.      HISTORY OF PRESENT ILLNESS:  Alden Rothman is a 44 y.o. male presenting for an initial visit with me for tongue discomfort.      44 y.o. male following up for a tongue lesion, frequent clearing of throat, throat discomfort.  The patient reports that he has had a bump on the midline of his anterior tongue for the past 2 years with a white patch behind it. Patient reports the bump is painless and when brushing teeth, will bleed occasionally. Patient currently under immunological treatment for Hep C, and reports that he will have swollen glands in the throat. +TC. No voice changes. No SOB.  Occ mild swallowing effort. +LPR  with Pizza and energy drinks.     Not using acid reflux medication due to undergoing immunological treatment for Hep C.     Patient reports trying to cut back on smoking cigarettes and currently vaping.     The patient reports 2 areas of concern on his tongue. The initial bump has been present 2 years he does not think it is getting larger. The spot behind it has been there for about 9 months.  The patient states he brushes his tongue daily. He has been taking Epclusa for Hepatitis C for 3 months. He relates that the posterior lesion gets sore. He admits that he may have been a little aggressive with brushing when he first noticed the lesions. The patient states he can take Motrin.     The patient would like to proceed with excision of the lesions.       Past Medical History  He has a past medical history of Opioid use disorder. Surgical History  He has no past surgical history on file.   Social History  He reports that he has been smoking cigarettes. He has been exposed to tobacco smoke. He has never used smokeless tobacco. He reports that he does not currently use alcohol. He reports that he does not currently use drugs after having used the following drugs: Heroin, Fentanyl, and Marijuana. Allergies  Penicillin and Quetiapine     Family History  Family History   Problem Relation Name Age of Onset    Hypertension Mother      Hypertension Sister      Diabetes Maternal Grandmother      Colon cancer Maternal Grandfather  60        Review of Systems  All 10 systems were reviewed and negative except for above.      Physical Exam    ENT Physical Exam   ENT Physical Exam  Constitutional  Appearance: patient appears well-developed and well-nourished,  Head and Face  Appearance: head appears normal and face appears normal;  Ear  Auricles: right auricle normal; left auricle normal;  Nose  External Nose: nares patent bilaterally;  Oral Cavity/Oropharynx  Lips: normal;  Neck  Neck: neck normal; neck palpation  "normal;  Respiratory  Inspection: no retractions visible;  Cardiovascular  Inspection: no peripheral edema present;  Neurovestibular  Mental Status: alert and oriented;  Psychiatric: mood normal;  Cranial Nerves: cranial nerves intact;    Last Recorded Vitals  Temperature 36.9 °C (98.4 °F), temperature source Temporal, height 1.702 m (5' 7\"), weight 75.8 kg (167 lb).    Relevant Results       ----------------------------------------------------------------------  Procedures   BIOPSY  ***Injected 1 mL:[id number] epinephrine into the area. A punch biopsy was utilized for ***rem as a small portion of the mucosa and passed off for pathology specimen.     Time Spent  Prep time on day of patient encounter: 5-10 minutes  Time spent directly with patient, family or caregiver: 25 minutes  Additional Time Spent on Patient Care Activities/discuss care plan with SLP: 5 minutes  Documentation Time: 10 minutes  Other Time Spent: 0 minutes  Total: 50 minutes     Scribe Attestation  By signing my name below, I, Ruma Sinha , Scrrosa   attest that this documentation has been prepared under the direction and in the presence of Addi Pardo MD.  "

## 2025-04-15 DIAGNOSIS — F11.90 OPIOID USE DISORDER: ICD-10-CM

## 2025-04-15 RX ORDER — BUPRENORPHINE AND NALOXONE 8; 2 MG/1; MG/1
1 FILM, SOLUBLE BUCCAL; SUBLINGUAL 3 TIMES DAILY
Qty: 90 EACH | Refills: 2 | Status: SHIPPED | OUTPATIENT
Start: 2025-04-18 | End: 2025-05-18

## 2025-04-15 NOTE — PROGRESS NOTES
Contacted by patient, current bup prescription will run out 4/20, with next appointment 4/22. Did send refill to patients pharmacy with plan to see patient at previously arranged appointment.

## 2025-04-18 ENCOUNTER — PHARMACY VISIT (OUTPATIENT)
Dept: PHARMACY | Facility: CLINIC | Age: 45
End: 2025-04-18
Payer: MEDICAID

## 2025-04-18 PROCEDURE — RXMED WILLOW AMBULATORY MEDICATION CHARGE

## 2025-04-22 ENCOUNTER — HOSPITAL ENCOUNTER (OUTPATIENT)
Dept: EMERGENCY MEDICINE | Facility: HOSPITAL | Age: 45
Discharge: HOME | End: 2025-04-22
Payer: COMMERCIAL

## 2025-04-22 DIAGNOSIS — F11.90 OPIOID USE DISORDER: Primary | ICD-10-CM

## 2025-04-22 PROCEDURE — 99213 OFFICE O/P EST LOW 20 MIN: CPT | Performed by: EMERGENCY MEDICINE

## 2025-04-22 ASSESSMENT — ENCOUNTER SYMPTOMS
FEVER: 0
NAUSEA: 0
ARTHRALGIAS: 1
VOMITING: 0
CHILLS: 0

## 2025-04-22 NOTE — PROGRESS NOTES
Subjective   Alden Rothman is a 44 y.o. male who presents for routine Buprenorphine follow-up assessment. Visit completed using real time audio/ visual communication for virtual appointment.  Patient doing well since last appointment. Endorses compliance with home buprenorphine regimen without breakthrough cravings/ withdrawal, endorses current regimen continues to work well for him. Does note that most recent prescription the strips seemed different from usual and did not seem to dissolve as well, states he is able to tolerate the medication but is interested in potentially switching to SL tablets if [facility] continues to have current strips on formulary. Notes chronic knee pain is improving, has been utilizing ibuprofen for this. Is still smoking, is contemplating quitting but not interested in pharmacologic therapies for this currently. Did undergo excision/ biopsy on two intraoral lesions with ENT, notes post procedure pain is well controlled and denies any post procedural pain/ swelling currently. Notes biopsy results not available yet. Denies other acute complaints currently, notes new daughter is doing well at home.       OARRS:  No data recorded  I have personally reviewed the OARRS report for Alden Rothman. I have considered the risks of abuse, dependence, addiction and diversion    Urine Drug Screening  Last Urine Drug Screen: 12/17/2024    Recent Results (from the past 8760 hours)   Buprenorphine Confirm,Urine    Collection Time: 12/17/24  2:56 PM   Result Value Ref Range    BUPRENORPHINE GLUC, URINE 130 ng/mL    BUPRENORPHINE ,URINE <2 ng/mL    NALOXONE, URINE <100 ng/mL    NORBUPRENORPHINE GLUC,URINE 566 ng/mL    NORBUPRENORPHINE, URINE 63 ng/mL   Drug Screen, Urine With Reflex to Confirmation    Collection Time: 12/17/24  2:56 PM   Result Value Ref Range    Amphetamine Screen, Urine Presumptive Negative Presumptive Negative    Barbiturate Screen, Urine Presumptive Negative Presumptive Negative     "Benzodiazepines Screen, Urine Presumptive Negative Presumptive Negative    Cannabinoid Screen, Urine Presumptive Positive (A) Presumptive Negative    Cocaine Metabolite Screen, Urine Presumptive Negative Presumptive Negative    Fentanyl Screen, Urine Presumptive Negative Presumptive Negative    Opiate Screen, Urine Presumptive Negative Presumptive Negative    Oxycodone Screen, Urine Presumptive Negative Presumptive Negative    PCP Screen, Urine Presumptive Negative Presumptive Negative    Methadone Screen, Urine Presumptive Negative Presumptive Negative     Results are as expected.     Withdrawal Symptoms: none  Buprenorphine Side Effects: none      Agreement for Buprenorphine/(Naloxone) for the treatment of Opioid use  Reviewed Controlled Substance Agreement including but not limited to the benefits, risks, and alternatives to treatment with a Controlled Substance medication(s).   Date of the Last Agreement: 4/22/2025    Nicotine Use  smoker, contemplative       Health Maintenance  No results found for: \"TBSIN\", \"PPD\"   No results found for: \"PREGTESTUR\", \"PREGSERUM\"  HIV 1 and 2 Screen   Date Value Ref Range Status   06/29/2023 NONREACTIVE NONREACTIVE Final     Comment:      HIV Ag/Ab screen is performed using the Siemens [employer]   HIV Ag/Ab Combo assay which detects the presence of HIV    p24 antigen as well as antibodies to HIV-1   (Group M and O) and HIV-2.  .  No laboratory evidence of HIV infection. If acute HIV infection is   suspected, consider testing for HIV RNA by PCR (viral load).       No results found for: \"LABRPR\", \"RPR\"  Hepatitis B Core AB- Total   Date Value Ref Range Status   01/17/2025 Nonreactive Nonreactive Final       Patient is meeting requirements of buprenorphine program, including : abstinence from alcohol and other drugs     Review of Systems   Constitutional:  Negative for chills and fever.   HENT:  Positive for dental problem.    Gastrointestinal:  Negative for nausea and vomiting. "   Musculoskeletal:  Positive for arthralgias.   Skin:  Negative for rash.       Objective   There were no vitals taken for this visit.    Physical exam limited due to virtual nature of visit  Physical Exam  HENT:      Head: Normocephalic and atraumatic.   Eyes:      General: Lids are normal.      Conjunctiva/sclera:      Right eye: Right conjunctiva is not injected.      Left eye: Left conjunctiva is not injected.   Pulmonary:      Effort: No tachypnea or bradypnea.   Neurological:      Mental Status: He is alert and oriented to person, place, and time. Mental status is at baseline.      GCS: GCS eye subscore is 4. GCS verbal subscore is 5. GCS motor subscore is 6.   Psychiatric:         Attention and Perception: Attention and perception normal.         Mood and Affect: Mood normal.         Speech: Speech normal.         Behavior: Behavior normal.         Thought Content: Thought content normal.         Assessment/Plan   Patient doing well on current buprenorphine regimen, no red flags for diversion, no breakthrough withdrawal/ cravings reported by patient. Plan to continue TID buprenorphine regimen, will consider transitioning to SL tablets if patient having difficulty tolerating strips. Otherwise patient contemplating smoking cessation currently, will re-engage at next appointment. Refill prescription sent to patient's pharmacy 4/18, will plan to obtain UDS at next appointment.    Buprenorphine Program:  Meeting requirements to continue program, for abstinence based OUD recovery goals.  Urine drug screens: Results are as expected.   OARRS: Results are as expected.   Recovery Activities: Buprenorphine maintenance therapy  Challenges: None identified today    Risks for abuse of Buprenorphine/Naloxone and safety requirements  (hidden, locked area, out of reach of children and pets; Narcan prescription) are revisited yearly with CSA and as needed- no current concerns.    Benefits of treatment for relapse prevention  outweigh risks associated with Buprenorphine.    Plan/Revision to Plan:  Continue current dose of Buprenorphine/Naloxone:   24 mg daily  OARRS at every appointment and as needed.  Drug screen random, quarterly and at other times as needed.  Meetings encouraged when safe and accessible.  Counselling available as needed.    Follow-up 7/15/2025 Buprenorphine Program

## 2025-04-23 LAB
LABORATORY COMMENT REPORT: NORMAL
PATH REPORT.FINAL DX SPEC: NORMAL
PATH REPORT.GROSS SPEC: NORMAL
PATH REPORT.RELEVANT HX SPEC: NORMAL
PATH REPORT.TOTAL CANCER: NORMAL

## 2025-04-29 ENCOUNTER — SPECIALTY PHARMACY (OUTPATIENT)
Dept: PHARMACY | Facility: CLINIC | Age: 45
End: 2025-04-29

## 2025-04-29 NOTE — PROGRESS NOTES
This patient was called on 4/29/2025 , to inquire if he had completed his HCV regimen. He stated that he did complete it without any side effects or missed doses. He was reminded to complete labs and schedule a follow up appointment with Dottie Berg. He was reminded to complete SVR labs in July. He verbalized understanding. He didn't have any questions for the pharmacist.

## 2025-05-16 ENCOUNTER — PHARMACY VISIT (OUTPATIENT)
Dept: PHARMACY | Facility: CLINIC | Age: 45
End: 2025-05-16
Payer: MEDICAID

## 2025-05-16 PROCEDURE — RXMED WILLOW AMBULATORY MEDICATION CHARGE

## 2025-05-28 ENCOUNTER — SPECIALTY PHARMACY (OUTPATIENT)
Dept: PHARMACY | Facility: CLINIC | Age: 45
End: 2025-05-28

## 2025-05-28 NOTE — PROGRESS NOTES
A courtesy call was made to the patient 5/28/2025, to remind him to complete HCV labs. The patient stated that they'll complete labs 6/4/2025..

## 2025-06-04 ENCOUNTER — ANESTHESIA (OUTPATIENT)
Dept: GASTROENTEROLOGY | Facility: HOSPITAL | Age: 45
End: 2025-06-04
Payer: COMMERCIAL

## 2025-06-04 ENCOUNTER — HOSPITAL ENCOUNTER (OUTPATIENT)
Dept: GASTROENTEROLOGY | Facility: HOSPITAL | Age: 45
Discharge: HOME | End: 2025-06-04
Payer: COMMERCIAL

## 2025-06-04 ENCOUNTER — ANESTHESIA EVENT (OUTPATIENT)
Dept: GASTROENTEROLOGY | Facility: HOSPITAL | Age: 45
End: 2025-06-04
Payer: COMMERCIAL

## 2025-06-04 VITALS
OXYGEN SATURATION: 93 % | DIASTOLIC BLOOD PRESSURE: 68 MMHG | HEART RATE: 76 BPM | HEIGHT: 67 IN | WEIGHT: 165 LBS | TEMPERATURE: 97.2 F | SYSTOLIC BLOOD PRESSURE: 106 MMHG | RESPIRATION RATE: 16 BRPM | BODY MASS INDEX: 25.9 KG/M2

## 2025-06-04 DIAGNOSIS — R19.4 CHANGE IN BOWEL HABITS: ICD-10-CM

## 2025-06-04 PROCEDURE — 45378 DIAGNOSTIC COLONOSCOPY: CPT | Performed by: STUDENT IN AN ORGANIZED HEALTH CARE EDUCATION/TRAINING PROGRAM

## 2025-06-04 PROCEDURE — 7100000009 HC PHASE TWO TIME - INITIAL BASE CHARGE

## 2025-06-04 PROCEDURE — 3700000002 HC GENERAL ANESTHESIA TIME - EACH INCREMENTAL 1 MINUTE

## 2025-06-04 PROCEDURE — 7100000010 HC PHASE TWO TIME - EACH INCREMENTAL 1 MINUTE

## 2025-06-04 PROCEDURE — 3700000001 HC GENERAL ANESTHESIA TIME - INITIAL BASE CHARGE

## 2025-06-04 PROCEDURE — A45378 PR COLONOSCOPY,DIAGNOSTIC: Performed by: ANESTHESIOLOGY

## 2025-06-04 PROCEDURE — 2500000004 HC RX 250 GENERAL PHARMACY W/ HCPCS (ALT 636 FOR OP/ED): Mod: SE

## 2025-06-04 PROCEDURE — A45378 PR COLONOSCOPY,DIAGNOSTIC

## 2025-06-04 RX ORDER — GLYCOPYRROLATE 0.2 MG/ML
INJECTION INTRAMUSCULAR; INTRAVENOUS AS NEEDED
Status: DISCONTINUED | OUTPATIENT
Start: 2025-06-04 | End: 2025-06-04

## 2025-06-04 RX ORDER — ALBUTEROL SULFATE 0.83 MG/ML
2.5 SOLUTION RESPIRATORY (INHALATION) ONCE AS NEEDED
Status: DISCONTINUED | OUTPATIENT
Start: 2025-06-04 | End: 2025-06-05 | Stop reason: HOSPADM

## 2025-06-04 RX ORDER — PROPOFOL 10 MG/ML
INJECTION, EMULSION INTRAVENOUS AS NEEDED
Status: DISCONTINUED | OUTPATIENT
Start: 2025-06-04 | End: 2025-06-04

## 2025-06-04 RX ORDER — SODIUM CHLORIDE, SODIUM LACTATE, POTASSIUM CHLORIDE, CALCIUM CHLORIDE 600; 310; 30; 20 MG/100ML; MG/100ML; MG/100ML; MG/100ML
INJECTION, SOLUTION INTRAVENOUS CONTINUOUS PRN
Status: DISCONTINUED | OUTPATIENT
Start: 2025-06-04 | End: 2025-06-04

## 2025-06-04 RX ORDER — ACETAMINOPHEN 325 MG/1
650 TABLET ORAL EVERY 4 HOURS PRN
Status: DISCONTINUED | OUTPATIENT
Start: 2025-06-04 | End: 2025-06-05 | Stop reason: HOSPADM

## 2025-06-04 RX ORDER — SODIUM CHLORIDE, SODIUM LACTATE, POTASSIUM CHLORIDE, CALCIUM CHLORIDE 600; 310; 30; 20 MG/100ML; MG/100ML; MG/100ML; MG/100ML
100 INJECTION, SOLUTION INTRAVENOUS CONTINUOUS
Status: SHIPPED | OUTPATIENT
Start: 2025-06-04 | End: 2025-06-04

## 2025-06-04 RX ORDER — LIDOCAINE IN NACL,ISO-OSMOT/PF 30 MG/3 ML
0.1 SYRINGE (ML) INJECTION ONCE
Status: DISCONTINUED | OUTPATIENT
Start: 2025-06-04 | End: 2025-06-05 | Stop reason: HOSPADM

## 2025-06-04 RX ORDER — MIDAZOLAM HYDROCHLORIDE 1 MG/ML
INJECTION INTRAMUSCULAR; INTRAVENOUS AS NEEDED
Status: DISCONTINUED | OUTPATIENT
Start: 2025-06-04 | End: 2025-06-04

## 2025-06-04 RX ORDER — LIDOCAINE HCL/PF 100 MG/5ML
SYRINGE (ML) INTRAVENOUS AS NEEDED
Status: DISCONTINUED | OUTPATIENT
Start: 2025-06-04 | End: 2025-06-04

## 2025-06-04 RX ORDER — ONDANSETRON HYDROCHLORIDE 2 MG/ML
4 INJECTION, SOLUTION INTRAVENOUS ONCE AS NEEDED
Status: DISCONTINUED | OUTPATIENT
Start: 2025-06-04 | End: 2025-06-05 | Stop reason: HOSPADM

## 2025-06-04 RX ADMIN — PROPOFOL 50 MG: 10 INJECTION, EMULSION INTRAVENOUS at 10:14

## 2025-06-04 RX ADMIN — PROPOFOL 200 MCG/KG/MIN: 10 INJECTION, EMULSION INTRAVENOUS at 10:13

## 2025-06-04 RX ADMIN — SODIUM CHLORIDE, POTASSIUM CHLORIDE, SODIUM LACTATE AND CALCIUM CHLORIDE: 600; 310; 30; 20 INJECTION, SOLUTION INTRAVENOUS at 10:10

## 2025-06-04 RX ADMIN — LIDOCAINE HYDROCHLORIDE 70 MG: 20 INJECTION INTRAVENOUS at 10:10

## 2025-06-04 RX ADMIN — GLYCOPYRROLATE 0.1 MG: 0.2 INJECTION, SOLUTION INTRAMUSCULAR; INTRAVENOUS at 10:10

## 2025-06-04 RX ADMIN — MIDAZOLAM HYDROCHLORIDE 2 MG: 2 INJECTION, SOLUTION INTRAMUSCULAR; INTRAVENOUS at 10:10

## 2025-06-04 SDOH — HEALTH STABILITY: MENTAL HEALTH: CURRENT SMOKER: 0

## 2025-06-04 ASSESSMENT — ENCOUNTER SYMPTOMS
NAUSEA: 0
CONSTIPATION: 0
HEADACHES: 0
ABDOMINAL DISTENTION: 0
CHILLS: 0
LIGHT-HEADEDNESS: 0
CONFUSION: 0
UNEXPECTED WEIGHT CHANGE: 0
DIARRHEA: 0
SHORTNESS OF BREATH: 0
SLEEP DISTURBANCE: 0
ABDOMINAL PAIN: 0
COLOR CHANGE: 0
FEVER: 0
WHEEZING: 0
BLOOD IN STOOL: 0
SPEECH DIFFICULTY: 0
TROUBLE SWALLOWING: 0
ARTHRALGIAS: 0
DIFFICULTY URINATING: 0
COUGH: 0
JOINT SWELLING: 0
DIZZINESS: 0
VOMITING: 0

## 2025-06-04 ASSESSMENT — COLUMBIA-SUICIDE SEVERITY RATING SCALE - C-SSRS
2. HAVE YOU ACTUALLY HAD ANY THOUGHTS OF KILLING YOURSELF?: NO
6. HAVE YOU EVER DONE ANYTHING, STARTED TO DO ANYTHING, OR PREPARED TO DO ANYTHING TO END YOUR LIFE?: NO
1. IN THE PAST MONTH, HAVE YOU WISHED YOU WERE DEAD OR WISHED YOU COULD GO TO SLEEP AND NOT WAKE UP?: NO

## 2025-06-04 ASSESSMENT — PAIN SCALES - GENERAL
PAINLEVEL_OUTOF10: 0 - NO PAIN

## 2025-06-04 ASSESSMENT — PAIN - FUNCTIONAL ASSESSMENT
PAIN_FUNCTIONAL_ASSESSMENT: 0-10
PAIN_FUNCTIONAL_ASSESSMENT: 0-10

## 2025-06-04 NOTE — H&P
History Of Present Illness  Alden Rothman is a 45 y.o. male presenting with colonoscopy for change in bowel habits.     Past Medical History  Medical History[1]  Surgical History  Surgical History[2]  Social History  He reports that he has been smoking cigarettes. He has been exposed to tobacco smoke. He has never used smokeless tobacco. He reports that he does not currently use alcohol. He reports that he does not currently use drugs after having used the following drugs: Heroin, Fentanyl, and Marijuana.    Family History  Family History[3]     Allergies  Allergies[4]  Review of Systems   Constitutional:  Negative for chills, fever and unexpected weight change.   HENT:  Negative for congestion and trouble swallowing.    Respiratory:  Negative for cough, shortness of breath and wheezing.    Cardiovascular:  Negative for chest pain.   Gastrointestinal:  Negative for abdominal distention, abdominal pain, blood in stool, constipation, diarrhea, nausea and vomiting.   Genitourinary:  Negative for difficulty urinating.   Musculoskeletal:  Negative for arthralgias and joint swelling.   Skin:  Negative for color change.   Neurological:  Negative for dizziness, speech difficulty, light-headedness and headaches.   Psychiatric/Behavioral:  Negative for confusion and sleep disturbance.         Physical Exam  Constitutional:       General: He is awake.      Appearance: Normal appearance.   HENT:      Head: Normocephalic and atraumatic.      Nose: Nose normal.      Mouth/Throat:      Mouth: Mucous membranes are moist.   Eyes:      Pupils: Pupils are equal, round, and reactive to light.   Neck:      Thyroid: No thyroid mass.      Trachea: Phonation normal.   Cardiovascular:      Rate and Rhythm: Normal rate and regular rhythm.   Pulmonary:      Effort: Pulmonary effort is normal. No respiratory distress.      Breath sounds: Normal air entry. No decreased breath sounds, wheezing, rhonchi or rales.   Abdominal:      General: Bowel  "sounds are normal. There is no distension.      Palpations: Abdomen is soft.      Tenderness: There is no abdominal tenderness.   Musculoskeletal:      Cervical back: Neck supple.      Right lower leg: No edema.      Left lower leg: No edema.   Skin:     General: Skin is warm.      Capillary Refill: Capillary refill takes less than 2 seconds.   Neurological:      General: No focal deficit present.      Mental Status: He is alert and oriented to person, place, and time. Mental status is at baseline.      Cranial Nerves: Cranial nerves 2-12 are intact.      Motor: Motor function is intact.   Psychiatric:         Attention and Perception: Attention and perception normal.         Mood and Affect: Mood normal.         Speech: Speech normal.         Behavior: Behavior normal.          Last Recorded Vitals  Blood pressure 142/88, pulse 86, temperature 36.9 °C (98.4 °F), temperature source Temporal, resp. rate 19, height 1.702 m (5' 7\"), weight 74.8 kg (165 lb), SpO2 98%.    Assessment/Plan   colonoscopy for change in bowel habits.     Heather Garcia MD       [1]   Past Medical History:  Diagnosis Date    Anxiety     Delayed emergence from general anesthesia     Opioid use disorder    [2] History reviewed. No pertinent surgical history.  [3]   Family History  Problem Relation Name Age of Onset    Hypertension Mother      Hypertension Sister      Diabetes Maternal Grandmother      Colon cancer Maternal Grandfather  60   [4]   Allergies  Allergen Reactions    Penicillin Unknown    Quetiapine Swelling     "

## 2025-06-04 NOTE — ANESTHESIA PREPROCEDURE EVALUATION
"Patient: Alden Rothman    Procedure Information       Date/Time: 06/04/25 1020    Scheduled providers: Heather Garcia MD; Marilee Giles RN    Procedure: COLONOSCOPY    Location: [facility]          43 y/o male here for colonoscopy.   PMH: HCV, treated this year (negative HCV RNA in 03/2025), Anxiety/Depression, previous h/o drug use on Suboxone for two years       Relevant Problems   Neuro   (+) Opioid use disorder      Liver   (+) Chronic hepatitis C without hepatic coma (Multi)      ID   (+) Chronic hepatitis C without hepatic coma (Multi)       Clinical information reviewed:   Tobacco  Allergies  Meds  Problems  Med Hx  Surg Hx   Fam Hx  Soc   Hx        NPO Detail:  NPO/Void Status  Carbohydrate Drink Given Prior to Surgery? : N  Date of Last Liquid: 06/04/25  Time of Last Liquid: 0600  Date of Last Solid: 06/02/25  Time of Last Solid: 2100  Last Intake Type: Clear fluids  Time of Last Void: 0924         Vitals:    06/04/25 0924   BP: 142/88   Pulse: 86   Resp: 19   Temp: 36.9 °C (98.4 °F)   SpO2: 98%       Surgical History[1]  Medical History[2]  Current Medications[3]  RX Allergies[4]  Social History     Tobacco Use    Smoking status: Every Day     Types: Cigarettes     Passive exposure: Current    Smokeless tobacco: Never   Substance Use Topics    Alcohol use: Not Currently         Chemistry    Lab Results   Component Value Date/Time     01/17/2025 1347    K 4.1 01/17/2025 1347     01/17/2025 1347    CO2 29 01/17/2025 1347    BUN 13 01/17/2025 1347    CREATININE 0.84 01/17/2025 1347    Lab Results   Component Value Date/Time    CALCIUM 9.5 01/17/2025 1347    ALKPHOS 37 03/07/2025 1032    AST 18 03/07/2025 1032    ALT 14 03/07/2025 1032    BILITOT 0.3 03/07/2025 1032          Lab Results   Component Value Date/Time    WBC 10.3 01/17/2025 1347    HGB 14.1 01/17/2025 1347    HCT 42.8 01/17/2025 1347     01/17/2025 1347     No results found for: \"PROTIME\", " "\"PTT\", \"INR\"  No results found for this or any previous visit (from the past 4464 hours).  No results found for this or any previous visit from the past 1095 days.        NPO Detail:  NPO/Void Status  Carbohydrate Drink Given Prior to Surgery? : N  Date of Last Liquid: 06/04/25  Time of Last Liquid: 0600  Date of Last Solid: 06/02/25  Time of Last Solid: 2100  Last Intake Type: Clear fluids  Time of Last Void: 0924         Review of Systems    Physical Exam    Airway  Mallampati: III  TM distance: >3 FB     Cardiovascular   Rhythm: regular     Dental        Pulmonary Breath sounds clear to auscultation     Abdominal      Other findings: Anxious        Anesthesia Plan    History of general anesthesia?: yes  History of complications of general anesthesia?: no    ASA 3     MAC   (GA-TIVA)  The patient is not a current smoker.    intravenous induction   Anesthetic plan and risks discussed with patient.    Plan discussed with CRNA, attending and CAA.           [1] History reviewed. No pertinent surgical history.  [2]   Past Medical History:  Diagnosis Date    Anxiety     Delayed emergence from general anesthesia     Opioid use disorder    [3]   Current Outpatient Medications:     buprenorphine-naloxone (Suboxone) 8-2 mg SL film, Place 1 Film under the tongue 3 times a day, Disp: 90 each, Rfl: 2    polyethylene glycol (Miralax) 17 gram/dose powder, Mix 238 g of powder and drink see administration instructions. Take per bowel prep instructions (Patient not taking: Reported on 4/14/2025), Disp: 238 g, Rfl: 0    sofosbuvir-velpatasvir (Epclusa) 400-100 mg tablet, Take 1 tablet by mouth once daily. (Patient not taking: Reported on 6/4/2025), Disp: 28 tablet, Rfl: 2  [4]   Allergies  Allergen Reactions    Penicillin Unknown    Quetiapine Swelling     "

## 2025-06-04 NOTE — ANESTHESIA POSTPROCEDURE EVALUATION
Patient: Alden Rothman    Procedure Summary       Date: 06/04/25 Room / Location: [facility]    Anesthesia Start: 1010 Anesthesia Stop: 1038    Procedure: COLONOSCOPY Diagnosis: Change in bowel habits    Scheduled Providers: Heather Garcia MD; Marilee Giles RN Responsible Provider: Roya Shields MD    Anesthesia Type: MAC ASA Status: 3            Anesthesia Type: MAC    Vitals Value Taken Time   BP 96/53 06/04/25 10:47   Temp 36.2 °C (97.2 °F) 06/04/25 10:36   Pulse 71 06/04/25 10:47   Resp 16 06/04/25 10:47   SpO2 93 % 06/04/25 10:47       Anesthesia Post Evaluation    Patient location during evaluation: PACU  Patient participation: complete - patient participated  Level of consciousness: awake and alert  Pain management: adequate  Airway patency: patent  Cardiovascular status: acceptable  Respiratory status: acceptable  Hydration status: acceptable  Postoperative Nausea and Vomiting: none        No notable events documented.

## 2025-06-13 ENCOUNTER — PHARMACY VISIT (OUTPATIENT)
Dept: PHARMACY | Facility: CLINIC | Age: 45
End: 2025-06-13
Payer: MEDICAID

## 2025-06-13 PROCEDURE — RXMED WILLOW AMBULATORY MEDICATION CHARGE

## 2025-07-08 ENCOUNTER — TELEPHONE (OUTPATIENT)
Dept: BEHAVIORAL HEALTH | Facility: CLINIC | Age: 45
End: 2025-07-08
Payer: COMMERCIAL

## 2025-07-08 DIAGNOSIS — F11.90 OPIOID USE DISORDER: ICD-10-CM

## 2025-07-08 RX ORDER — BUPRENORPHINE AND NALOXONE 8; 2 MG/1; MG/1
1 FILM, SOLUBLE BUCCAL; SUBLINGUAL 3 TIMES DAILY
Qty: 90 EACH | Refills: 2 | Status: SHIPPED | OUTPATIENT
Start: 2025-07-11 | End: 2025-08-10

## 2025-07-08 NOTE — PROGRESS NOTES
Patient requesting refill for buprenorphine, per OARRS review last fill 6/13, next appointment 7/15, will refill prescription with plan to follow up 7/15 as previously planned.

## 2025-07-11 ENCOUNTER — PHARMACY VISIT (OUTPATIENT)
Dept: PHARMACY | Facility: CLINIC | Age: 45
End: 2025-07-11
Payer: MEDICAID

## 2025-07-11 PROCEDURE — RXMED WILLOW AMBULATORY MEDICATION CHARGE

## 2025-07-15 ENCOUNTER — HOSPITAL ENCOUNTER (OUTPATIENT)
Dept: EMERGENCY MEDICINE | Facility: HOSPITAL | Age: 45
Discharge: HOME | End: 2025-07-15
Payer: COMMERCIAL

## 2025-07-15 ENCOUNTER — LAB (OUTPATIENT)
Dept: LAB | Facility: HOSPITAL | Age: 45
End: 2025-07-15
Payer: COMMERCIAL

## 2025-07-15 DIAGNOSIS — F11.90 OPIOID USE DISORDER: Primary | ICD-10-CM

## 2025-07-15 PROCEDURE — 99213 OFFICE O/P EST LOW 20 MIN: CPT | Performed by: EMERGENCY MEDICINE

## 2025-07-15 NOTE — PROGRESS NOTES
Final Anesthesia Post-op Assessment    Patient: Franklin López  Procedure(s) Performed:   Anesthesia type: Anesthesia type cannot be found on the log.    Vital Last Value   Temperature 36.9 °C (98.5 °F) (04/29/18 1145)   Pulse 80 (04/29/18 1604)   Respiratory Rate 18 (04/29/18 1147)   Non-Invasive   Blood Pressure (!) 164/96 (Manual) (04/29/18 1605)   Arterial  Blood Pressure     Pulse Oximetry 100 % (04/29/18 1604)     Last 24 I/O:   Intake/Output Summary (Last 24 hours) at 04/29/18 1646  Last data filed at 04/29/18 1635   Gross per 24 hour   Intake             7995 ml   Output             9885 ml   Net            -1890 ml       PATIENT LOCATION: Phase II  POST-OP VITAL SIGNS: stable  LEVEL OF CONSCIOUSNESS: participates in exam, awake and oriented  RESPIRATORY STATUS: spontaneous ventilation  CARDIOVASCULAR: hypertensive  HYDRATION: euvolemic    PAIN MANAGEMENT: well controlled  NAUSEA: None  AIRWAY PATENCY: patent  POST-OP ASSESSMENT: no complications, patient tolerated procedure well with no complications and sufficiently recovered from acute administration of anesthesia effects and able to participate in evaluation  COMPLICATIONS: none  Comments: Discussed eleveated BP in setting of PEC with OB MD and they are aware and treating.       Subjective   Alden Rothman is a 45 y.o. male who presents for routine Buprenorphine follow-up assessment.    Patient endorses doing well on current buprenorphine regimen. Denies any breakthrough use/ cravings, denies any withdrawal symptoms, denies any oversedation. Does note that biopsy of resected tongue lesion was benign, is working on cutting back on smoking currently, feels that he is doing well with using a vape device for nicotine replacement, feels this is working well for him and declines interest in other pharmacologic intervention for smoking cessation currently.        OARRS:  No data recorded  I have personally reviewed the OARRS report for Alden Rothman. I have considered the risks of abuse, dependence, addiction and diversion    Urine Drug Screening  Last Urine Drug Screen: 7/15/2025, results as expected    Recent Results (from the past 8760 hours)   Drug Screen, Urine With Reflex to Confirmation    Collection Time: 07/15/25  9:35 AM   Result Value Ref Range    Fentanyl NEGATIVE <0.5 ng/mL    Amphetamines NEGATIVE <500 ng/mL    Barbiturates NEGATIVE <300 ng/mL    Benzodiazepines NEGATIVE <100 ng/mL    Cocaine Metabolite NEGATIVE <150 ng/mL    Marijuana Metabolite NEGATIVE <20 ng/mL    Methadone Metabolite NEGATIVE <100 ng/mL    Opiates NEGATIVE <100 ng/mL    Oxycodone NEGATIVE <100 ng/mL    Phencyclidine NEGATIVE <25 ng/mL    Creatinine 52.8 > or = 20.0 mg/dL    pH 5.4 4.5 - 9.0    Oxidant NEGATIVE <200 mcg/mL    Notes and Comments     Buprenorphine Confirm,Urine    Collection Time: 12/17/24  2:56 PM   Result Value Ref Range    BUPRENORPHINE GLUC, URINE 130 ng/mL    BUPRENORPHINE ,URINE <2 ng/mL    NALOXONE, URINE <100 ng/mL    NORBUPRENORPHINE GLUC,URINE 566 ng/mL    NORBUPRENORPHINE, URINE 63 ng/mL     Results are as expected.     Withdrawal Symptoms: none  Buprenorphine Side Effects: none          Agreement for Buprenorphine/(Naloxone) for the treatment of Opioid use  Reviewed Controlled  "Substance Agreement including but not limited to the benefits, risks, and alternatives to treatment with a Controlled Substance medication(s).   Date of the Last Agreement: 7/15/2025    Nicotine Use  smoker, action phase- utilizing vape device with plan to wean vape once cessation of cigarette use    Health Maintenance  No results found for: \"TBSIN\", \"PPD\"   No results found for: \"PREGTESTUR\", \"PREGSERUM\"  HIV 1 and 2 Screen   Date Value Ref Range Status   06/29/2023 NONREACTIVE NONREACTIVE Final     Comment:      HIV Ag/Ab screen is performed using the [employer]   HIV Ag/Ab Combo assay which detects the presence of HIV    p24 antigen as well as antibodies to HIV-1   (Group M and O) and HIV-2.  .  No laboratory evidence of HIV infection. If acute HIV infection is   suspected, consider testing for HIV RNA by PCR (viral load).       No results found for: \"LABRPR\", \"RPR\"  Hepatitis B Core AB- Total   Date Value Ref Range Status   01/17/2025 Nonreactive Nonreactive Final       Patient is meeting requirements of buprenorphine program, including : abstinence from alcohol and other drugs , keeping appointments , and meeting functional/personal goals      Review of Systems   Constitutional:  Negative for chills, fatigue and fever.   HENT:  Negative for congestion and rhinorrhea.    Eyes:  Negative for photophobia and visual disturbance.   Respiratory:  Negative for cough, chest tightness and shortness of breath.    Cardiovascular:  Negative for chest pain.   Gastrointestinal:  Negative for abdominal pain, nausea and vomiting.   Musculoskeletal:  Negative for arthralgias, back pain and neck pain.   Skin:  Negative for color change and rash.   Neurological:  Negative for dizziness, weakness and headaches.       Objective   There were no vitals taken for this visit.    Physical Exam  Vitals and nursing note reviewed.   Constitutional:       General: He is not in acute distress.     Appearance: Normal appearance. He is " not ill-appearing or toxic-appearing.   HENT:      Head: Normocephalic and atraumatic.      Nose: No congestion or rhinorrhea.      Mouth/Throat:      Mouth: Mucous membranes are moist.      Pharynx: Oropharynx is clear. No oropharyngeal exudate or posterior oropharyngeal erythema.     Eyes:      Extraocular Movements: Extraocular movements intact.      Right eye: Normal extraocular motion.      Left eye: Normal extraocular motion.      Conjunctiva/sclera: Conjunctivae normal.      Pupils: Pupils are equal, round, and reactive to light.       Cardiovascular:      Rate and Rhythm: Normal rate and regular rhythm.      Pulses: Normal pulses.      Heart sounds: Normal heart sounds, S1 normal and S2 normal. No murmur heard.     No friction rub. No gallop.   Pulmonary:      Effort: Pulmonary effort is normal. No respiratory distress.      Breath sounds: Normal breath sounds. No stridor. No wheezing, rhonchi or rales.   Abdominal:      General: Abdomen is flat. Bowel sounds are normal. There is no distension.      Palpations: Abdomen is soft.      Tenderness: There is no abdominal tenderness. There is no right CVA tenderness, left CVA tenderness, guarding or rebound.     Musculoskeletal:      Cervical back: Full passive range of motion without pain.      Right lower leg: No edema.      Left lower leg: No edema.     Skin:     General: Skin is warm and dry.     Neurological:      General: No focal deficit present.      Mental Status: He is alert and oriented to person, place, and time.      GCS: GCS eye subscore is 4. GCS verbal subscore is 5. GCS motor subscore is 6.      Cranial Nerves: No cranial nerve deficit.      Sensory: No sensory deficit.      Motor: No weakness, tremor or abnormal muscle tone.     Psychiatric:         Mood and Affect: Mood normal.         Assessment/Plan     Patient presenting for ongoing evaluation and management of opioid use disorder, appears to be doing well on current buprenorphine regimen, no  red flags for diversion, UDS obtained today negative for other substances. Plan to continue current buprenorphine regimen, also discussed smoking cessation today, patient feels he is doing well quitting cigarettes with vape device, did discuss that these devices have their own detrimental health effects but encouraged continuing smoking cessation, will reevaluate need for further pharmacologic therapy for smoking cessation at next visit. Refill sent to patient's pharmacy.    Buprenorphine Program:  Meeting requirements to continue program, for abstinence based OUD recovery goals.  Urine drug screens: Results are as expected.   OARRS: Results are as expected.   Recovery Activities: Compliance with buprenorphine therapy  Challenges: None identified today    Risks for abuse of Buprenorphine/Naloxone and safety requirements  (hidden, locked area, out of reach of children and pets; Narcan prescription) are revisited yearly with CSA and as needed- no current concerns.    Benefits of treatment for relapse prevention outweigh risks associated with Buprenorphine.    Plan/Revision to Plan:  Continue current dose of Buprenorphine/Naloxone:   24 mg daily  OARRS at every appointment and as needed.  Drug screen random, quarterly and at other times as needed.  Meetings encouraged when safe and accessible.  Counselling available as needed.    Follow-up 10/7/2025 Buprenorphine Program

## 2025-07-16 LAB
ALBUMIN SERPL-MCNC: 4.6 G/DL (ref 3.6–5.1)
ALP SERPL-CCNC: 34 U/L (ref 36–130)
ALT SERPL-CCNC: 11 U/L (ref 9–46)
AMPHETAMINES UR QL: NEGATIVE NG/ML
ANION GAP SERPL CALCULATED.4IONS-SCNC: 9 MMOL/L (CALC) (ref 7–17)
AST SERPL-CCNC: 16 U/L (ref 10–40)
BARBITURATES UR QL: NEGATIVE NG/ML
BASOPHILS # BLD AUTO: 69 CELLS/UL (ref 0–200)
BASOPHILS NFR BLD AUTO: 0.7 %
BENZODIAZ UR QL: NEGATIVE NG/ML
BILIRUB SERPL-MCNC: 0.3 MG/DL (ref 0.2–1.2)
BUN SERPL-MCNC: 13 MG/DL (ref 7–25)
BZE UR QL: NEGATIVE NG/ML
CALCIUM SERPL-MCNC: 9.5 MG/DL (ref 8.6–10.3)
CHLORIDE SERPL-SCNC: 105 MMOL/L (ref 98–110)
CHOLEST SERPL-MCNC: 229 MG/DL
CHOLEST/HDLC SERPL: 4 (CALC)
CO2 SERPL-SCNC: 28 MMOL/L (ref 20–32)
CREAT SERPL-MCNC: 0.93 MG/DL (ref 0.6–1.29)
CREAT UR-MCNC: 52.8 MG/DL
EGFRCR SERPLBLD CKD-EPI 2021: 104 ML/MIN/1.73M2
EOSINOPHIL # BLD AUTO: 284 CELLS/UL (ref 15–500)
EOSINOPHIL NFR BLD AUTO: 2.9 %
ERYTHROCYTE [DISTWIDTH] IN BLOOD BY AUTOMATED COUNT: 13.6 % (ref 11–15)
FENTANYL UR QL SCN: NEGATIVE NG/ML
GLUCOSE SERPL-MCNC: 70 MG/DL (ref 65–139)
HCT VFR BLD AUTO: 43.6 % (ref 38.5–50)
HCV RNA SERPL NAA+PROBE-ACNC: NORMAL IU/ML
HCV RNA SERPL NAA+PROBE-LOG IU: NORMAL LOG IU/ML
HDLC SERPL-MCNC: 57 MG/DL
HGB BLD-MCNC: 14.2 G/DL (ref 13.2–17.1)
HIV 1+2 AB+HIV1 P24 AG SERPL QL IA: NORMAL
HIV 1+2 AB+HIV1 P24 AG SERPL QL IA: NORMAL
LDLC SERPL CALC-MCNC: 148 MG/DL (CALC)
LYMPHOCYTES # BLD AUTO: 2734 CELLS/UL (ref 850–3900)
LYMPHOCYTES NFR BLD AUTO: 27.9 %
MCH RBC QN AUTO: 28.1 PG (ref 27–33)
MCHC RBC AUTO-ENTMCNC: 32.6 G/DL (ref 32–36)
MCV RBC AUTO: 86.2 FL (ref 80–100)
METHADONE UR QL: NEGATIVE NG/ML
MONOCYTES # BLD AUTO: 902 CELLS/UL (ref 200–950)
MONOCYTES NFR BLD AUTO: 9.2 %
NEUTROPHILS # BLD AUTO: 5811 CELLS/UL (ref 1500–7800)
NEUTROPHILS NFR BLD AUTO: 59.3 %
NONHDLC SERPL-MCNC: 172 MG/DL (CALC)
OPIATES UR QL: NEGATIVE NG/ML
OXIDANTS UR QL: NEGATIVE MCG/ML
OXYCODONE UR QL: NEGATIVE NG/ML
PCP UR QL: NEGATIVE NG/ML
PH UR: 5.4 [PH] (ref 4.5–9)
PLATELET # BLD AUTO: 347 THOUSAND/UL (ref 140–400)
PMV BLD REES-ECKER: 9.3 FL (ref 7.5–12.5)
POTASSIUM SERPL-SCNC: 4.5 MMOL/L (ref 3.5–5.3)
PROT SERPL-MCNC: 7.4 G/DL (ref 6.1–8.1)
QUEST NOTES AND COMMENTS: NORMAL
RBC # BLD AUTO: 5.06 MILLION/UL (ref 4.2–5.8)
SODIUM SERPL-SCNC: 142 MMOL/L (ref 135–146)
THC UR QL: NEGATIVE NG/ML
TRIGL SERPL-MCNC: 119 MG/DL
WBC # BLD AUTO: 9.8 THOUSAND/UL (ref 3.8–10.8)

## 2025-07-16 ASSESSMENT — ENCOUNTER SYMPTOMS
ARTHRALGIAS: 0
CHILLS: 0
HEADACHES: 0
NECK PAIN: 0
PHOTOPHOBIA: 0
SHORTNESS OF BREATH: 0
WEAKNESS: 0
VOMITING: 0
FEVER: 0
BACK PAIN: 0
NAUSEA: 0
RHINORRHEA: 0
FATIGUE: 0
DIZZINESS: 0
COUGH: 0
COLOR CHANGE: 0
ABDOMINAL PAIN: 0
CHEST TIGHTNESS: 0

## 2025-07-23 ENCOUNTER — SPECIALTY PHARMACY (OUTPATIENT)
Dept: PHARMACY | Facility: CLINIC | Age: 45
End: 2025-07-23

## 2025-07-24 ENCOUNTER — CLINICAL SUPPORT (OUTPATIENT)
Dept: AUDIOLOGY | Facility: CLINIC | Age: 45
End: 2025-07-24
Payer: COMMERCIAL

## 2025-07-24 ENCOUNTER — OFFICE VISIT (OUTPATIENT)
Dept: OTOLARYNGOLOGY | Facility: CLINIC | Age: 45
End: 2025-07-24
Payer: COMMERCIAL

## 2025-07-24 VITALS — HEIGHT: 67 IN | TEMPERATURE: 98.4 F | BODY MASS INDEX: 27 KG/M2 | WEIGHT: 172 LBS

## 2025-07-24 DIAGNOSIS — M54.2 NECK PAIN: ICD-10-CM

## 2025-07-24 DIAGNOSIS — K04.7 DENTAL INFECTION: Primary | ICD-10-CM

## 2025-07-24 DIAGNOSIS — M43.6 NECK STIFFNESS: ICD-10-CM

## 2025-07-24 DIAGNOSIS — H93.13 TINNITUS OF BOTH EARS: ICD-10-CM

## 2025-07-24 DIAGNOSIS — M26.609 TMJ DYSFUNCTION: ICD-10-CM

## 2025-07-24 DIAGNOSIS — H93.13 TINNITUS OF BOTH EARS: Primary | ICD-10-CM

## 2025-07-24 PROCEDURE — 92550 TYMPANOMETRY & REFLEX THRESH: CPT | Mod: 52

## 2025-07-24 PROCEDURE — 3008F BODY MASS INDEX DOCD: CPT | Performed by: NURSE PRACTITIONER

## 2025-07-24 PROCEDURE — 92557 COMPREHENSIVE HEARING TEST: CPT

## 2025-07-24 PROCEDURE — 99214 OFFICE O/P EST MOD 30 MIN: CPT | Performed by: NURSE PRACTITIONER

## 2025-07-24 RX ORDER — DOXYCYCLINE 100 MG/1
100 CAPSULE ORAL 2 TIMES DAILY
Qty: 20 CAPSULE | Refills: 0 | Status: SHIPPED | OUTPATIENT
Start: 2025-07-24 | End: 2025-08-03

## 2025-07-24 RX ORDER — HYDROXYZINE HYDROCHLORIDE 50 MG/1
TABLET, FILM COATED ORAL
COMMUNITY
Start: 2025-07-23

## 2025-07-24 ASSESSMENT — PATIENT HEALTH QUESTIONNAIRE - PHQ9
1. LITTLE INTEREST OR PLEASURE IN DOING THINGS: NOT AT ALL
SUM OF ALL RESPONSES TO PHQ9 QUESTIONS 1 & 2: 0
2. FEELING DOWN, DEPRESSED OR HOPELESS: NOT AT ALL

## 2025-07-24 ASSESSMENT — PAIN SCALES - GENERAL: PAINLEVEL_OUTOF10: 0-NO PAIN

## 2025-07-24 NOTE — PROGRESS NOTES
"    ADULT AUDIOLOGY EVALUATION      Name:  Alden Rothman   :  1980  Age:  45 y.o.  Date of Evaluation:  2025    Time: 14:02-14:23    IMPRESSIONS     Right ear: normal middle ear functioning, normal hearing sensitivity, and excellent (96%) word understanding at 50 dB HL.   Left ear: normal middle ear functioning, essentially normal hearing sensitivity, and excellent (96%) word understanding at 50 dB HL.     RECOMMENDATIONS     Continue medical follow up with primary care provider and/or Ears Nose and Throat (ENT) provider as recommended.  Re-test hearing in conjunction with otologic care, or annually to monitor.  Tinnitus coping techniques/strategies (i.e., keeping himself in sound - using tv, radio, fans, soothing sounds, music, etc.) to keep the brain busy and less focused on the tinnitus.  Avoid exposure to loud sounds by moving away from the noise, turning down the volume, or wearing proper hearing protection correctly.    HISTORY     Mr. Rothman, 45 y.o., was seen today for an initial audiologic evaluation in conjunction with an evaluation with Lynn Howard APRN.CNP; See same day encounter in the Ears Nose and Throat (ENT) department for additional information. History obtained from patient report and chart review.     Alden reported that about 10 days ago he started experiencing a high pitched ringing sensation in both ears that lasted about 5 days in duration. After that the pitch changed to sounding like \"water coming out of a faucet\" for a couple of days, and now it is a low pitched buzzing sound. He noted that the sensation sounds like it's in both ears, but possibly slightly louder in the left ear. He also reported that this same type of incident happened about a year ago but it only lasted for 3 days and then the tinnitus subsided. He is worried because the tinnitus seems to be changing and not subsiding.    Alden did report a history of recreational noise exposure (shooting guns) as a " child without the use of hearing protection. He also endorsed a recently cracked/infected tooth on the left side of his mouth. He denied dizziness, aural fullness, recent ear infections, otalgia, otorrhea or history of otologic surgeries.    AUDIOGRAM         TEST RESULTS     Otoscopic Evaluation:  Right Ear: Ear canal clear, tympanic membrane visualized.  Left Ear: Ear canal clear, tympanic membrane visualized.    Tympanometry (226 Hz): This test is an objective evaluation of middle ear function. CPT code: 64080   Right Ear: Type A, middle ear pressure and tympanic membrane compliance within normal limits.   Left Ear: Type A, middle ear pressure and tympanic membrane compliance within normal limits.     Acoustic Reflexes: This test is an objective measure of auditory and facial nerve pathways.   (Probe) Right Ear (ipsi right stimulus ear; contralateral left stimulus ear): (Ipsilateral) Responses absent at 500-4000 Hz. Upward deflection noted at each frequency.   (Probe) Left Ear (ipsi left stimulus ear; contralateral right stimulus ear): (Ipsilateral) Responses present at 500-2000 Hz, and absent at 4000 Hz.    Distortion Product Otoacoustic Emissions (DPOAE): This test is a measurement of responses which are generated by the cochlea when it is simultaneously stimulated by two pure tone frequencies. CPT code: 43243   Right Ear: (9725-3890 Hz) Essentially present. Responses present at 7680-5817 Hz. Response(s) absent at all other frequencies tested.  Left Ear: (4897-9782 Hz) Essentially present. Responses present at 0646-7333 Hz. Response(s) absent at all other frequencies tested.    Test technique: Conventional Audiometry via headphones. This test is an evaluation hearing sensitivity via air and bone conduction and speech recognition testing. CPT code: 14518  Reliability: good    Pure Tone Audiometry (125-8000 Hz):     Right Ear: Hearing sensitivity within normal limits for 125-8000 Hz.  Left Ear: Hearing  sensitivity essentially within normal limits for 125-8000 Hz.    Speech Audiometry:   Right Ear: Speech Reception Threshold (SRT) was obtained at 10 dB HL. This is in good agreement with three frequency Pure Tone Average.   Word Recognition scores were excellent (96%) in quiet when words were presented at 50 dB HL. These results are based on [facility] Auditory Test No.6 (NU-6) Ordered by difficulty (N=10).  Left Ear: Speech Reception Threshold (SRT) was obtained at 15 dB HL. This is in good agreement with three frequency Pure Tone Average.   Word Recognition scores were excellent (96%) in quiet when words were presented at 50 dB HL. These results are based on [facility] Auditory Test No.6 (NU-6) Ordered by difficulty (N=10).     Comparison of today's results with previous test results: No previous results available.        Felipa Sue, CCC-A, [facility]  Senior Clinical Audiologist -  Audiology Services       Degree of   Hearing Sensitivity dB Range   Within Normal Limits (WNL) 0 - 20   Slight 25   Mild 26 - 40   Moderate 41 - 55   Moderately-Severe 56 - 70   Severe 71 - 90   Profound 91 +     Key   CHL Conductive Hearing Loss   ECV Ear Canal Volume   HA Hearing Aid   NIHL Noise-Induced Hearing Loss   PTA Pure Tone Average   SNHL Sensorineural Hearing Loss   TM Tympanic Membrane   WNL Within Normal Limits

## 2025-07-24 NOTE — PROGRESS NOTES
Subjective   Patient ID: Alden Rothman is a 45 y.o. male who presents for Follow-up (Ringing inside of ear to left ear water sound.).  HPI  This patient is referred for evaluation of  non-pulsatile bilateral tinnitus.  The patient is accompanied by his girlfriend.   When asked about ear pain, hearing loss, discharge from ear, tinnitus, aural fullness or autophony, the patient admits to sudden onset of bilateral tinnitus 10 days ago.  He reports that over the past few days severity has somewhat decreased and may be more prevalent on the left side.  He also reports a tooth infection to the left upper teeth.  He does not currently have a dentist.  When asked whether the tinnitus interfered with the patient's daily activities or prevented the patient from falling asleep, the patient reported it does not but causes him significant anxiety.  He reports history of PTSD but is exacerbated by the tinnitus.  He also complains of a mass to the back of his neck which is tender.  When asked about a significant past otological history including history of prior ear surgery, noise exposure, exposure to ototoxic drugs or agents, and/or family history of hearing loss, the patient admits to one incidence of left-sided noise exposure while shooting guns as a teenager.      Review of Systems  A comprehensive or 10 points review of the patient's constitutional, neurological, HEENT, pulmonary, cardiovascular and genito-urinary systems showed only those mentioned in history of present illness.    Objective   Physical Exam  Constitutional: no fever, chills, weight loss or weight gain   General appearance: Appears well, well-nourished, well groomed. No acute distress.   Communication: Normal communication   Psychiatric: Oriented to person, place and time. Normal mood and affect.   Neurologic: Cranial nerves II-XII grossly intact and symmetric bilaterally.   Head and Face:   Head: Atraumatic with no masses, lesions or scarring.   Face:  Normal symmetry, no paralysis, synkinesis or facial tic. No scars or deformities.   TMJ: Bilateral tenderness  Eyes: Conjunctiva not edematous or erythematous   Ears: External inspection of ears with no deformity, scars or masses. Bilateral EACs clear and bilateral TMs intact with no signs of effusions   Nose: External inspection of nose: No nasal lesions, lacerations or scars.    Oral Cavity/Mouth: Oral cavity and oropharynx mucosa moist and pink   Neck: Normal appearing, symmetric, trachea midline.  No posterior masses palpated.  Cardiovascular: Examination of peripheral vascular system shows no clubbing or cyanosis.   Respiratory: No respiratory distress increased work of breathing. Inspection of the chest with symmetric chest expansion and normal respiratory effort.   Skin: No rashes in the head or neck    My interpretation of the audiogram done today is normal hearing with excellent word recognition scores and normal tympanograms bilaterally.  There is a slight left greater than right asymmetry at 4000 Hz, but hearing thresholds are within normal limits.    Assessment/Plan        This patient presents for initial evaluation of acute acquired dental infection, bilateral tinnitus as well as chronic TMJ dysfunction, neck pain, neck stiffness.    Reassurance given that otologic exam and audiogram are both normal.  We discussed that there is a slight asymmetry on the left likely from his history of noise exposure, but hearing thresholds are normal.  Much reassurance given that his current tinnitus is most likely due to the current dental infection and history of TMJ dysfunction.  I recommended he see a dentist as soon as possible.  He reports that this is difficult due to his work and family schedule and is requesting antibiotics.  I recommended a 10-day course of doxycycline but I was not prescribed him anything after this.  Again we discussed the importance of seeing a dentist.  If his infection resolves but the  tinnitus does not, I asked that he contact my office.  We also discussed that chronic neck pain/stiffness can contribute to tinnitus.  On exam, there is no palpable mass on the back of his neck, but muscles are tight.  He may follow-up as needed.  All questions were answered to patient's satisfaction.    This note was created using speech recognition transcription software. Despite proofreading, several typographical errors might be present that might affect the meaning of the content. Please call with any questions.      GAETANO Linn-FERMIN 07/24/25 3:50 PM

## 2025-08-08 ENCOUNTER — PHARMACY VISIT (OUTPATIENT)
Dept: PHARMACY | Facility: CLINIC | Age: 45
End: 2025-08-08
Payer: MEDICAID

## 2025-08-08 PROCEDURE — RXMED WILLOW AMBULATORY MEDICATION CHARGE

## 2025-09-05 ENCOUNTER — PHARMACY VISIT (OUTPATIENT)
Dept: PHARMACY | Facility: CLINIC | Age: 45
End: 2025-09-05
Payer: MEDICAID

## 2025-09-05 PROCEDURE — RXMED WILLOW AMBULATORY MEDICATION CHARGE
